# Patient Record
Sex: FEMALE | Race: WHITE | NOT HISPANIC OR LATINO | Employment: OTHER
[De-identification: names, ages, dates, MRNs, and addresses within clinical notes are randomized per-mention and may not be internally consistent; named-entity substitution may affect disease eponyms.]

---

## 2017-01-25 ENCOUNTER — RECORDS - HEALTHEAST (OUTPATIENT)
Dept: ADMINISTRATIVE | Facility: OTHER | Age: 57
End: 2017-01-25

## 2017-02-13 ENCOUNTER — HOSPITAL ENCOUNTER (OUTPATIENT)
Dept: RADIOLOGY | Facility: HOSPITAL | Age: 57
Discharge: HOME | End: 2017-02-13
Attending: UROLOGY

## 2017-02-13 DIAGNOSIS — N20.9 URINARY CALCULUS: ICD-10-CM

## 2017-02-22 ENCOUNTER — RECORDS - HEALTHEAST (OUTPATIENT)
Dept: ADMINISTRATIVE | Facility: OTHER | Age: 57
End: 2017-02-22

## 2017-02-22 ENCOUNTER — RECORDS - HEALTHEAST (OUTPATIENT)
Dept: LAB | Facility: CLINIC | Age: 57
End: 2017-02-22

## 2017-02-22 LAB
CHOLEST SERPL-MCNC: 221 MG/DL
FASTING STATUS PATIENT QL REPORTED: ABNORMAL
HDLC SERPL-MCNC: 63 MG/DL
LDLC SERPL CALC-MCNC: 111 MG/DL
TRIGL SERPL-MCNC: 234 MG/DL

## 2017-03-07 ENCOUNTER — AMBULATORY - HEALTHEAST (OUTPATIENT)
Dept: SURGERY | Facility: CLINIC | Age: 57
End: 2017-03-07

## 2017-03-07 DIAGNOSIS — E66.9 OBESITY: ICD-10-CM

## 2017-03-07 DIAGNOSIS — I10 ESSENTIAL (PRIMARY) HYPERTENSION: ICD-10-CM

## 2017-04-04 ENCOUNTER — COMMUNICATION - HEALTHEAST (OUTPATIENT)
Dept: SURGERY | Facility: CLINIC | Age: 57
End: 2017-04-04

## 2017-05-02 ENCOUNTER — COMMUNICATION - HEALTHEAST (OUTPATIENT)
Dept: SURGERY | Facility: CLINIC | Age: 57
End: 2017-05-02

## 2017-06-21 ENCOUNTER — OFFICE VISIT - HEALTHEAST (OUTPATIENT)
Dept: SURGERY | Facility: CLINIC | Age: 57
End: 2017-06-21

## 2017-06-21 DIAGNOSIS — Z87.19 HISTORY OF IBS: ICD-10-CM

## 2017-06-21 DIAGNOSIS — I10 HYPERTENSION: ICD-10-CM

## 2017-06-21 DIAGNOSIS — F25.9 SCHIZOAFFECTIVE DISORDER (H): ICD-10-CM

## 2017-06-21 DIAGNOSIS — E66.01 OBESITY, CLASS III, BMI 40-49.9 (MORBID OBESITY) (H): ICD-10-CM

## 2017-06-21 DIAGNOSIS — G47.33 OSA ON CPAP: ICD-10-CM

## 2017-06-21 RX ORDER — MONTELUKAST SODIUM 10 MG/1
10 TABLET ORAL DAILY
Status: SHIPPED | COMMUNITY
Start: 2017-06-17 | End: 2024-01-02

## 2017-06-21 RX ORDER — PRAVASTATIN SODIUM 40 MG
40 TABLET ORAL AT BEDTIME
Status: SHIPPED | COMMUNITY
Start: 2017-05-02

## 2017-06-21 RX ORDER — BENZTROPINE MESYLATE 1 MG/1
1 TABLET ORAL AT BEDTIME
Status: SHIPPED | COMMUNITY
Start: 2017-06-01 | End: 2024-01-02

## 2017-06-21 RX ORDER — PERPHENAZINE 8 MG/1
8 TABLET ORAL AT BEDTIME
Status: SHIPPED | COMMUNITY
Start: 2017-06-01

## 2017-06-21 ASSESSMENT — MIFFLIN-ST. JEOR: SCORE: 1758.6

## 2017-06-22 ENCOUNTER — COMMUNICATION - HEALTHEAST (OUTPATIENT)
Dept: SURGERY | Facility: CLINIC | Age: 57
End: 2017-06-22

## 2017-06-22 ENCOUNTER — COMMUNICATION - HEALTHEAST (OUTPATIENT)
Dept: SCHEDULING | Facility: CLINIC | Age: 57
End: 2017-06-22

## 2017-06-22 ENCOUNTER — OFFICE VISIT - HEALTHEAST (OUTPATIENT)
Dept: FAMILY MEDICINE | Facility: CLINIC | Age: 57
End: 2017-06-22

## 2017-06-22 DIAGNOSIS — T50.905A MEDICATION SIDE EFFECT, INITIAL ENCOUNTER: ICD-10-CM

## 2017-06-22 DIAGNOSIS — F25.9 SCHIZOAFFECTIVE DISORDER (H): ICD-10-CM

## 2017-06-22 DIAGNOSIS — F41.9 ANXIETY: ICD-10-CM

## 2017-07-03 ENCOUNTER — COMMUNICATION - HEALTHEAST (OUTPATIENT)
Dept: SCHEDULING | Facility: CLINIC | Age: 57
End: 2017-07-03

## 2017-07-15 ENCOUNTER — COMMUNICATION - HEALTHEAST (OUTPATIENT)
Dept: INTENSIVE CARE | Facility: HOSPITAL | Age: 57
End: 2017-07-15

## 2017-07-16 ENCOUNTER — COMMUNICATION - HEALTHEAST (OUTPATIENT)
Dept: FAMILY MEDICINE | Facility: CLINIC | Age: 57
End: 2017-07-16

## 2017-07-16 ENCOUNTER — OFFICE VISIT - HEALTHEAST (OUTPATIENT)
Dept: FAMILY MEDICINE | Facility: CLINIC | Age: 57
End: 2017-07-16

## 2017-07-16 ENCOUNTER — COMMUNICATION - HEALTHEAST (OUTPATIENT)
Dept: SCHEDULING | Facility: CLINIC | Age: 57
End: 2017-07-16

## 2017-07-16 DIAGNOSIS — M10.9 ACUTE GOUT: ICD-10-CM

## 2017-07-16 DIAGNOSIS — M79.672 LEFT FOOT PAIN: ICD-10-CM

## 2017-11-06 ENCOUNTER — HOSPITAL ENCOUNTER (OUTPATIENT)
Dept: MAMMOGRAPHY | Facility: HOSPITAL | Age: 57
Discharge: HOME | End: 2017-11-06
Attending: FAMILY MEDICINE

## 2017-11-06 DIAGNOSIS — Z12.31 VISIT FOR SCREENING MAMMOGRAM: ICD-10-CM

## 2017-12-01 ENCOUNTER — RECORDS - HEALTHEAST (OUTPATIENT)
Dept: ADMINISTRATIVE | Facility: OTHER | Age: 57
End: 2017-12-01

## 2018-02-01 ENCOUNTER — RECORDS - HEALTHEAST (OUTPATIENT)
Dept: ADMINISTRATIVE | Facility: OTHER | Age: 58
End: 2018-02-01

## 2018-03-21 ENCOUNTER — RECORDS - HEALTHEAST (OUTPATIENT)
Dept: ADMINISTRATIVE | Facility: OTHER | Age: 58
End: 2018-03-21

## 2018-05-29 ENCOUNTER — OFFICE VISIT - HEALTHEAST (OUTPATIENT)
Dept: RHEUMATOLOGY | Facility: CLINIC | Age: 58
End: 2018-05-29

## 2018-05-29 DIAGNOSIS — M25.50 POLYARTHRALGIA: ICD-10-CM

## 2018-05-29 DIAGNOSIS — M17.0 BILATERAL PRIMARY OSTEOARTHRITIS OF KNEE: ICD-10-CM

## 2018-05-30 ENCOUNTER — COMMUNICATION - HEALTHEAST (OUTPATIENT)
Dept: LAB | Facility: CLINIC | Age: 58
End: 2018-05-30

## 2018-05-31 ENCOUNTER — COMMUNICATION - HEALTHEAST (OUTPATIENT)
Dept: ADMINISTRATIVE | Facility: CLINIC | Age: 58
End: 2018-05-31

## 2018-06-19 ENCOUNTER — RECORDS - HEALTHEAST (OUTPATIENT)
Dept: LAB | Facility: CLINIC | Age: 58
End: 2018-06-19

## 2018-06-19 LAB
ALBUMIN SERPL-MCNC: 4.2 G/DL (ref 3.5–5)
ALP SERPL-CCNC: 113 U/L (ref 45–120)
ALT SERPL W P-5'-P-CCNC: 45 U/L (ref 0–45)
ANION GAP SERPL CALCULATED.3IONS-SCNC: 12 MMOL/L (ref 5–18)
AST SERPL W P-5'-P-CCNC: 41 U/L (ref 0–40)
BILIRUB SERPL-MCNC: 0.6 MG/DL (ref 0–1)
BUN SERPL-MCNC: 7 MG/DL (ref 8–22)
CALCIUM SERPL-MCNC: 10.1 MG/DL (ref 8.5–10.5)
CHLORIDE BLD-SCNC: 104 MMOL/L (ref 98–107)
CHOLEST SERPL-MCNC: 188 MG/DL
CO2 SERPL-SCNC: 25 MMOL/L (ref 22–31)
CREAT SERPL-MCNC: 0.75 MG/DL (ref 0.6–1.1)
FASTING STATUS PATIENT QL REPORTED: ABNORMAL
GFR SERPL CREATININE-BSD FRML MDRD: >60 ML/MIN/1.73M2
GLUCOSE BLD-MCNC: 171 MG/DL (ref 70–125)
HDLC SERPL-MCNC: 53 MG/DL
LDLC SERPL CALC-MCNC: 78 MG/DL
POTASSIUM BLD-SCNC: 3.9 MMOL/L (ref 3.5–5)
PROT SERPL-MCNC: 7.5 G/DL (ref 6–8)
SODIUM SERPL-SCNC: 141 MMOL/L (ref 136–145)
TRIGL SERPL-MCNC: 283 MG/DL

## 2018-11-03 ENCOUNTER — COMMUNICATION - HEALTHEAST (OUTPATIENT)
Dept: SCHEDULING | Facility: CLINIC | Age: 58
End: 2018-11-03

## 2018-11-04 ENCOUNTER — OFFICE VISIT - HEALTHEAST (OUTPATIENT)
Dept: FAMILY MEDICINE | Facility: CLINIC | Age: 58
End: 2018-11-04

## 2018-11-04 DIAGNOSIS — R29.898 PROMINENT XIPHOID: ICD-10-CM

## 2018-11-08 ENCOUNTER — HOSPITAL ENCOUNTER (OUTPATIENT)
Dept: MAMMOGRAPHY | Facility: CLINIC | Age: 58
Discharge: HOME | End: 2018-11-08
Attending: FAMILY MEDICINE

## 2018-11-08 DIAGNOSIS — Z12.31 ENCOUNTER FOR SCREENING MAMMOGRAM FOR BREAST CANCER: ICD-10-CM

## 2019-02-27 ENCOUNTER — RECORDS - HEALTHEAST (OUTPATIENT)
Dept: LAB | Facility: CLINIC | Age: 59
End: 2019-02-27

## 2019-03-01 LAB — BACTERIA SPEC CULT: ABNORMAL

## 2019-03-29 ENCOUNTER — RECORDS - HEALTHEAST (OUTPATIENT)
Dept: LAB | Facility: CLINIC | Age: 59
End: 2019-03-29

## 2019-03-29 LAB
ALBUMIN SERPL-MCNC: 4.3 G/DL (ref 3.5–5)
ALP SERPL-CCNC: 106 U/L (ref 45–120)
ALT SERPL W P-5'-P-CCNC: 27 U/L (ref 0–45)
ANION GAP SERPL CALCULATED.3IONS-SCNC: 11 MMOL/L (ref 5–18)
AST SERPL W P-5'-P-CCNC: 25 U/L (ref 0–40)
BILIRUB SERPL-MCNC: 0.4 MG/DL (ref 0–1)
BUN SERPL-MCNC: 9 MG/DL (ref 8–22)
CALCIUM SERPL-MCNC: 10.5 MG/DL (ref 8.5–10.5)
CHLORIDE BLD-SCNC: 103 MMOL/L (ref 98–107)
CHOLEST SERPL-MCNC: 189 MG/DL
CO2 SERPL-SCNC: 28 MMOL/L (ref 22–31)
CREAT SERPL-MCNC: 0.74 MG/DL (ref 0.6–1.1)
FASTING STATUS PATIENT QL REPORTED: ABNORMAL
GFR SERPL CREATININE-BSD FRML MDRD: >60 ML/MIN/1.73M2
GLUCOSE BLD-MCNC: 94 MG/DL (ref 70–125)
HDLC SERPL-MCNC: 70 MG/DL
LDLC SERPL CALC-MCNC: 88 MG/DL
POTASSIUM BLD-SCNC: 4.6 MMOL/L (ref 3.5–5)
PROT SERPL-MCNC: 7.6 G/DL (ref 6–8)
SODIUM SERPL-SCNC: 142 MMOL/L (ref 136–145)
TRIGL SERPL-MCNC: 155 MG/DL
TSH SERPL DL<=0.005 MIU/L-ACNC: 1.14 UIU/ML (ref 0.3–5)

## 2019-07-01 ENCOUNTER — RECORDS - HEALTHEAST (OUTPATIENT)
Dept: ADMINISTRATIVE | Facility: OTHER | Age: 59
End: 2019-07-01

## 2019-10-03 ENCOUNTER — RECORDS - HEALTHEAST (OUTPATIENT)
Dept: ADMINISTRATIVE | Facility: OTHER | Age: 59
End: 2019-10-03

## 2019-10-10 ENCOUNTER — RECORDS - HEALTHEAST (OUTPATIENT)
Dept: ADMINISTRATIVE | Facility: OTHER | Age: 59
End: 2019-10-10

## 2019-10-10 ENCOUNTER — AMBULATORY - HEALTHEAST (OUTPATIENT)
Dept: ADMINISTRATIVE | Facility: CLINIC | Age: 59
End: 2019-10-10

## 2019-10-10 DIAGNOSIS — R42 DIZZINESS: ICD-10-CM

## 2019-11-22 ENCOUNTER — OFFICE VISIT - HEALTHEAST (OUTPATIENT)
Dept: OTOLARYNGOLOGY | Facility: CLINIC | Age: 59
End: 2019-11-22

## 2019-11-22 ENCOUNTER — OFFICE VISIT - HEALTHEAST (OUTPATIENT)
Dept: AUDIOLOGY | Facility: CLINIC | Age: 59
End: 2019-11-22

## 2019-11-22 DIAGNOSIS — H90.3 SENSORINEURAL HEARING LOSS, BILATERAL: ICD-10-CM

## 2019-11-22 DIAGNOSIS — H93.13 TINNITUS, BILATERAL: ICD-10-CM

## 2019-11-22 DIAGNOSIS — R42 DIZZINESS AND GIDDINESS: ICD-10-CM

## 2019-11-22 DIAGNOSIS — Z87.898 HISTORY OF VERTIGO: ICD-10-CM

## 2019-11-22 DIAGNOSIS — M26.621 ARTHRALGIA OF RIGHT TEMPOROMANDIBULAR JOINT: ICD-10-CM

## 2019-11-29 ENCOUNTER — HOSPITAL ENCOUNTER (OUTPATIENT)
Dept: MAMMOGRAPHY | Facility: CLINIC | Age: 59
Discharge: HOME | End: 2019-11-29
Attending: FAMILY MEDICINE

## 2019-11-29 DIAGNOSIS — Z12.31 VISIT FOR SCREENING MAMMOGRAM: ICD-10-CM

## 2020-02-10 ENCOUNTER — RECORDS - HEALTHEAST (OUTPATIENT)
Dept: ADMINISTRATIVE | Facility: OTHER | Age: 60
End: 2020-02-10

## 2020-02-24 ENCOUNTER — COMMUNICATION - HEALTHEAST (OUTPATIENT)
Dept: SCHEDULING | Facility: CLINIC | Age: 60
End: 2020-02-24

## 2020-02-24 ENCOUNTER — TRANSFERRED RECORDS (OUTPATIENT)
Dept: HEALTH INFORMATION MANAGEMENT | Facility: CLINIC | Age: 60
End: 2020-02-24

## 2020-02-24 ENCOUNTER — HOSPITAL ENCOUNTER (OUTPATIENT)
Dept: MRI IMAGING | Facility: HOSPITAL | Age: 60
Discharge: HOME | End: 2020-02-24
Attending: PSYCHIATRY & NEUROLOGY

## 2020-02-24 DIAGNOSIS — R42 DIZZINESS: ICD-10-CM

## 2020-02-27 ENCOUNTER — RECORDS - HEALTHEAST (OUTPATIENT)
Dept: LAB | Facility: CLINIC | Age: 60
End: 2020-02-27

## 2020-02-27 LAB
FOLATE SERPL-MCNC: 7.8 NG/ML
TSH SERPL DL<=0.005 MIU/L-ACNC: 1.29 UIU/ML (ref 0.3–5)
VIT B12 SERPL-MCNC: 215 PG/ML (ref 213–816)

## 2020-02-28 LAB — 25(OH)D3 SERPL-MCNC: 7.3 NG/ML (ref 30–80)

## 2020-03-03 ENCOUNTER — RECORDS - HEALTHEAST (OUTPATIENT)
Dept: LAB | Facility: HOSPITAL | Age: 60
End: 2020-03-03

## 2020-03-03 LAB
25(OH)D3 SERPL-MCNC: 7.5 NG/ML (ref 30–80)
FOLATE SERPL-MCNC: 9.9 NG/ML
TSH SERPL DL<=0.005 MIU/L-ACNC: 3.24 UIU/ML (ref 0.3–5)
VIT B12 SERPL-MCNC: 194 PG/ML (ref 213–816)

## 2020-07-21 ENCOUNTER — COMMUNICATION - HEALTHEAST (OUTPATIENT)
Dept: SCHEDULING | Facility: CLINIC | Age: 60
End: 2020-07-21

## 2020-09-11 ENCOUNTER — HOSPITAL ENCOUNTER (OUTPATIENT)
Dept: LAB | Age: 60
Setting detail: SPECIMEN
Discharge: HOME | End: 2020-09-11

## 2020-09-11 ENCOUNTER — RECORDS - HEALTHEAST (OUTPATIENT)
Dept: LAB | Facility: CLINIC | Age: 60
End: 2020-09-11

## 2020-09-11 LAB
ALBUMIN SERPL-MCNC: 4.2 G/DL (ref 3.5–5)
ALP SERPL-CCNC: 116 U/L (ref 45–120)
ALT SERPL W P-5'-P-CCNC: 25 U/L (ref 0–45)
ANION GAP SERPL CALCULATED.3IONS-SCNC: 14 MMOL/L (ref 5–18)
AST SERPL W P-5'-P-CCNC: 18 U/L (ref 0–40)
BILIRUB SERPL-MCNC: 0.5 MG/DL (ref 0–1)
BUN SERPL-MCNC: 10 MG/DL (ref 8–22)
CALCIUM SERPL-MCNC: 9.8 MG/DL (ref 8.5–10.5)
CHLORIDE BLD-SCNC: 107 MMOL/L (ref 98–107)
CHOLEST SERPL-MCNC: 178 MG/DL
CO2 SERPL-SCNC: 23 MMOL/L (ref 22–31)
CREAT SERPL-MCNC: 0.69 MG/DL (ref 0.6–1.1)
FASTING STATUS PATIENT QL REPORTED: NORMAL
GFR SERPL CREATININE-BSD FRML MDRD: >60 ML/MIN/1.73M2
GLUCOSE BLD-MCNC: 103 MG/DL (ref 70–125)
HDLC SERPL-MCNC: 68 MG/DL
LDLC SERPL CALC-MCNC: 88 MG/DL
POTASSIUM BLD-SCNC: 4.2 MMOL/L (ref 3.5–5)
PROT SERPL-MCNC: 7.2 G/DL (ref 6–8)
SODIUM SERPL-SCNC: 144 MMOL/L (ref 136–145)
TRIGL SERPL-MCNC: 111 MG/DL
TSH SERPL DL<=0.005 MIU/L-ACNC: 1.32 UIU/ML (ref 0.3–5)
VIT B12 SERPL-MCNC: 799 PG/ML (ref 213–816)

## 2020-09-14 LAB
25(OH)D3 SERPL-MCNC: 60 NG/ML (ref 30–80)
COVID-19 ANTIBODY IGG: NEGATIVE

## 2020-12-15 ENCOUNTER — COMMUNICATION - HEALTHEAST (OUTPATIENT)
Dept: OTOLARYNGOLOGY | Facility: CLINIC | Age: 60
End: 2020-12-15

## 2021-03-06 ENCOUNTER — COMMUNICATION - HEALTHEAST (OUTPATIENT)
Dept: OTOLARYNGOLOGY | Facility: CLINIC | Age: 61
End: 2021-03-06

## 2021-05-18 ENCOUNTER — COMMUNICATION - HEALTHEAST (OUTPATIENT)
Dept: SCHEDULING | Facility: CLINIC | Age: 61
End: 2021-05-18

## 2021-05-26 ENCOUNTER — RECORDS - HEALTHEAST (OUTPATIENT)
Dept: LAB | Facility: CLINIC | Age: 61
End: 2021-05-26

## 2021-05-26 LAB
ALBUMIN SERPL-MCNC: 4.2 G/DL (ref 3.5–5)
ALP SERPL-CCNC: 109 U/L (ref 45–120)
ALT SERPL W P-5'-P-CCNC: 25 U/L (ref 0–45)
ANION GAP SERPL CALCULATED.3IONS-SCNC: 14 MMOL/L (ref 5–18)
AST SERPL W P-5'-P-CCNC: 20 U/L (ref 0–40)
BILIRUB SERPL-MCNC: 0.4 MG/DL (ref 0–1)
BUN SERPL-MCNC: 8 MG/DL (ref 8–22)
CALCIUM SERPL-MCNC: 9.3 MG/DL (ref 8.5–10.5)
CHLORIDE BLD-SCNC: 103 MMOL/L (ref 98–107)
CHOLEST SERPL-MCNC: 172 MG/DL
CO2 SERPL-SCNC: 22 MMOL/L (ref 22–31)
CREAT SERPL-MCNC: 0.72 MG/DL (ref 0.6–1.1)
FASTING STATUS PATIENT QL REPORTED: ABNORMAL
GFR SERPL CREATININE-BSD FRML MDRD: >60 ML/MIN/1.73M2
GLUCOSE BLD-MCNC: 118 MG/DL (ref 70–125)
HDLC SERPL-MCNC: 65 MG/DL
LDLC SERPL CALC-MCNC: 73 MG/DL
POTASSIUM BLD-SCNC: 4.1 MMOL/L (ref 3.5–5)
PROT SERPL-MCNC: 7.2 G/DL (ref 6–8)
SODIUM SERPL-SCNC: 139 MMOL/L (ref 136–145)
TRIGL SERPL-MCNC: 171 MG/DL
VIT B12 SERPL-MCNC: 835 PG/ML (ref 213–816)

## 2021-05-27 LAB — 25(OH)D3 SERPL-MCNC: 56.6 NG/ML (ref 30–80)

## 2021-05-31 ENCOUNTER — RECORDS - HEALTHEAST (OUTPATIENT)
Dept: ADMINISTRATIVE | Facility: CLINIC | Age: 61
End: 2021-05-31

## 2021-05-31 VITALS — WEIGHT: 253.7 LBS | BODY MASS INDEX: 40.33 KG/M2

## 2021-05-31 VITALS — HEIGHT: 67 IN | BODY MASS INDEX: 40.62 KG/M2 | WEIGHT: 258.8 LBS

## 2021-06-01 ENCOUNTER — RECORDS - HEALTHEAST (OUTPATIENT)
Dept: ADMINISTRATIVE | Facility: CLINIC | Age: 61
End: 2021-06-01

## 2021-06-01 VITALS — WEIGHT: 250 LBS | BODY MASS INDEX: 39.75 KG/M2

## 2021-06-02 VITALS — BODY MASS INDEX: 32.85 KG/M2 | WEIGHT: 206.6 LBS

## 2021-06-03 NOTE — PROGRESS NOTES
HISTORY OF PRESENT ILLNESS  Patient reports that she was hospitalized in March because she stood up and thought that she was fainting. She got very dizzy. She was told to see and ENT. She has been sick a fair amount and was unable to come in until recently. Pain in the right ear that comes and goes. She can't turn her body very fast or else she will get dizzy. Looking down is also a problems. No change in her hearing. She has constant ringing in the ear. She reports that she had an episode of dizziness several months ago. She had two episodes of dizziness since March. The first time the symptoms lasted an hour. Most recently it lasted 5-10 minutes. She is a diabetic and her medications haven't changed.     REVIEW OF SYSTEMS  Review of Systems: a 10-system review was performed. Pertinent positives are noted in the HPI and on a separate scanned document in the chart.    PMH, PSH, FH and SH has documented in the EHR.      EXAM    CONSTITUTIONAL  General Appearance:  Normal, well developed, well nourished, no obvious distress  Ability to Communicate:  communicates appropriately.    HEAD AND FACE  Appearance and Symmetry:  Normal, no scalp or facial scarring or suspicious lesions.  Paranasal sinuses tenderness:  Normal, Paranasal sinuses non tender  TMJ: Pain to palpation right TMJ    EARS  Clinical speech reception threshold:  Normal, able to hear normal speech.  Auricle:  Normal, Auricles without scars, lesions, masses.  External auditory canal:  Normal, External auditory canal normal.  Tympanic membrane:  Normal, Tympanic membranes normal without swelling or erythema.  Tympanic membrane mobility:  Normal, Normal tympanic membrane mobility.    NOSE (speculum or scope)  Architecture:  Normal, Grossly normal external nasal architecture with no masses or lesions.  Mucosa:  Normal mucosa, No polyps or masses.  Septum:  Normal, Septum non-obstructing.  Turbinates:  Normal, No turbinate abnormalities    ORAL CAVITY AND  OROPHARYNX  Lips:  Normal.  Dental and gingiva:  Normal, No obvious dental or gingival disease.  Mucosa:  Normal, Moist mucous membranes.  Tongue:  Normal, Tongue mobile with no mucosal abnormalities  Hard and soft palate:  Normal, Hard and soft palate without cleft or mucosal lesions.  Oral pharynx:  Normal, Posterior pharynx without lesions or remarkable asymmetry.  Saliva:  Normal, Clear saliva.  Masses:  Normal, No palpable masses or pathologically enlarged lymph nodes.    NECK  Masses/lymph nodes:  Normal, No worrisome neck masses or lymph nodes.  Salivary glands:  Normal, Parotid and submandibular glands.  Trachea and larynx position:  Normal, Trachea and larynx midline.  Thyroid:  Normal, No thyroid abnormality.  Tenderness:  Normal, No cervical tenderness.  Suppleness:  Normal, Neck supple    NEUROLOGICAL  Speech pattern:  Normal, Proasaic    RESPIRATORY  Symmetry and Respiratory effort:  Normal, Symmetric chest movement and expansion with no increased intercostal retractions or use of accessory muscles.     IMPRESSION  Patient has several concerns regarding her symptoms. Her first was that her symptoms might be a sign of Meniere's. I reassured her that this was unlikely given the symmetrical hearing and lack of tinnitus and hearing loss with the episodes. In addition she has only had two episodes, one lasting an hour and the other lasting 5 minutes. She reports sensation of dysequilibrium with looking down. However no symptoms of vertigo. I explained that the side effect profile of several of her medications include slight dysequilibrium / dizziness. With regards to her pain in the ear, this is likely related TMJ. The pain wakes her up at night and is in the right jaw. She had pain to palpation of the right TMJ. Lastly her tinnitus is likely related to the hearing loss.     RECOMMENDATION  I discussed vestibular evaluation. She is going to hold off for now and see how her symptoms progress. She understands  that if her symptoms of dizziness return or worsen she can call to set up VNG and vestibular evaluation.     Bernardo Springer MD

## 2021-06-06 NOTE — TELEPHONE ENCOUNTER
Pt was wondering if she should go to ED or not.    Pt is awaiting call back from her neurologist.  Worsening dizziness, has seen neurology, unsure if she should go to ED.  Pt follows with [facility].   Laying down helps, has nausea if she tries to get up.   BS is 161. Pt is diabetic.   Feels really hot. Unsure if she has a fever, fan helps.   Denies difficulty breathing.   Difficulty standing for long periods of time.   Unable to bend down or look down.       Reason for Disposition    SEVERE dizziness (e.g., unable to stand, requires support to walk, feels like passing out now)    Protocols used: DIZZINESS - [id number]    Triaged to disposition of Go to ED now or PCP triage.  Pt stated understanding to recommendations. RN advised patient to either contact PCP/Neurologist or go to ED based on recommendations from protocol.  RN recommended if she goes to ED to have someone else drive her.   Pt stated understanding to recommendations.   Brandi Buitrago, RN   [facility] RN Triage

## 2021-06-09 NOTE — TELEPHONE ENCOUNTER
Pt called stated she has diabetes and worried about if she contracted covid what will happen to her, and how she can prevent from getting covid 19. Pt has no covid symptomes or exposure. Pt stated she also wants reassurance because she has anxiety about covid 19.Pt was given covid19 prevention recommendations, and to stay positive, eating healthy, getting enough sleep, physically active, and to stay well hydrated. Pt was also advised to washing hands often, wear face mask, staying away from other people 6 feet. Pt stated okay.  Sam Blackmon RN  COVID 19 Nurse Triage Plan/Patient Instructions    Please be aware that novel coronavirus (COVID-19) may be circulating in the community. If you develop symptoms such as fever, cough, or SOB or if you have concerns about the presence of another infection including coronavirus (COVID-19), please contact your health care provider or visit [email or web address].     Disposition/Instructions    Home care recommended. Follow home care protocol based instructions.    Thank you for taking steps to prevent the spread of this virus.  o Limit your contact with others.  o Wear a simple mask to cover your cough.  o Wash your hands well and often.    Resources    [facility]: About COVID-19: [email or web address]    CDC: What to Do If You're Sick: www.cdc.gov/coronavirus/2019-ncov/about/steps-when-sick.html    CDC: Ending Home Isolation: www.cdc.gov/coronavirus/2019-ncov/hcp/disposition-in-home-patients.html     CDC: Caring for Someone: www.cdc.gov/coronavirus/2019-ncov/if-you-are-sick/care-for-someone.html     [facility]: Interim Guidance for Hospital Discharge to Home: www.health.[facility].[place]/diseases/coronavirus/hcp/hospdischarge.pdf    [facility] clinical trials (COVID-19 research studies): clinicalaffairs.[email or web address]/umn-clinical-trials     Below are the COVID-19 hotlines at the [place] Department [facility] ([facility]). Interpreters are available.   o For health questions:  Call 103-599-3753 or 1-851.907.2839 (7 a.m. to 7 p.m.)  o For questions about schools and childcare: Call 254-318-6795 or 1-519.357.4295 (7 a.m. to 7 p.m.)     Reason for Disposition    COVID-19 Prevention and Healthy Living, questions about    Additional Information    Negative: COVID-19 has been diagnosed by a healthcare provider (HCP)    Negative: COVID-19 lab test positive    Negative: [1] Symptoms of COVID-19 (e.g., cough, fever, SOB, or others) AND [2] lives in an area with community spread    Negative: [1] Symptoms of COVID-19 (e.g., cough, fever, SOB, or others) AND [2] within 14 days of EXPOSURE (close contact) with diagnosed or suspected COVID-19 patient    Negative: [1] Symptoms of COVID-19 (e.g., cough, fever, SOB, or others) AND [2] within 14 days of travel from high-risk area for COVID-19 community spread (identified by CDC)    Negative: [1] Difficulty breathing (shortness of breath) occurs AND [2] onset > 14 days after COVID-19 EXPOSURE (Close Contact) AND [3] no community spread where patient lives    Negative: [1] Dry cough occurs AND [2] onset > 14 days after COVID-19 EXPOSURE AND [3] no community spread where patient lives    Negative: [1] Wet cough (i.e., white-yellow, yellow, green, or palmer colored sputum) AND [2] onset > 14 days after COVID-19 EXPOSURE AND [3] no community spread where patient lives    Negative: [1] Common cold symptoms AND [2] onset > 14 days after COVID-19 EXPOSURE AND [3] no community spread where patient lives    Negative: [1] COVID-19 EXPOSURE (Close Contact) within last 14 days AND [2] needs COVID-19 lab test to return to work AND [3] NO symptoms    Negative: [1] COVID-19 EXPOSURE (Close Contact) within last 14 days AND [2] exposed person is a healthcare worker who was NOT using all recommended personal protective equipment (i.e., a respirator-N95 mask, eye protection, gloves, and gown) AND [3] NO symptoms    Negative: [1] COVID-19 EXPOSURE (Close Contact) AND [2] within  last 14 days BUT [3] NO symptoms    Negative: [1] COVID-19 EXPOSURE AND [2] 15 or more days ago AND [3] NO symptoms    Negative: [1] Living in area with community spread (identified by local PHD) BUT [2] NO symptoms    Negative: [1] Travel from area with community spread (identified by CDC) AND [2] within last 14 days BUT [3] NO symptoms    Negative: [1] No COVID-19 EXPOSURE BUT [2] living with someone who was exposed and who has no symptoms of COVID-19    Negative: [1] Caller concerned that exposure to COVID-19 occurred BUT [2] does not meet COVID-19 EXPOSURE criteria from CDC    Negative: COVID-19 Testing, questions about    Protocols used: CORONAVIRUS (COVID-19) EXPOSURE-A- 5.16.20

## 2021-06-11 NOTE — PROGRESS NOTES
Chief Complaint   Patient presents with     Toe Pain     right great toe, redness, swelling, very painful x 4 days       HPI    Patient is here for 4 days of moderate, constant left great toe pain with redness and swelling. No injury, fever, chills. He has remote hx of gout. No diabetes.     ROS: Pertinent ROS noted in HPI.     Allergies   Allergen Reactions     Naltrexone Anxiety     Protonix [Pantoprazole] Nausea And Vomiting     Abilify [Aripiprazole] Other (See Comments)     seizure     Albuterol Other (See Comments)     shakey     Geodon [Ziprasidone Hcl] Other (See Comments)     psychosis     Saphris [Asenapine] Unknown     Demerol [Meperidine] Other (See Comments)     lightheaded     Naproxen Palpitations     Omeprazole Palpitations     Palpitations with dosage above 10 mg     Septra [Sulfamethoxazole-Trimethoprim] Unknown     Possibly caused a UTI?       There is no problem list on file for this patient.      Family History   Problem Relation Age of Onset     Multiple sclerosis Mother      Cancer Father      Hypertension Father      Diabetes Father      No Medical Problems Sister      No Medical Problems Daughter      No Medical Problems Maternal Grandmother      No Medical Problems Maternal Grandfather      No Medical Problems Paternal Grandmother      Cancer Paternal Grandfather      No Medical Problems Maternal Aunt      No Medical Problems Paternal Aunt      Obesity Brother      Diabetes Brother      Hyperlipidemia Brother      Hypertension Brother      BRCA 1/2 Neg Hx      Breast cancer Neg Hx      Colon cancer Neg Hx      Endometrial cancer Neg Hx      Ovarian cancer Neg Hx        Social History     Social History     Marital status:      Spouse name: N/A     Number of children: N/A     Years of education: N/A     Occupational History     Not on file.     Social History Main Topics     Smoking status: Former Smoker     Quit date: 1/1/1997     Smokeless tobacco: Never Used      Comment: Quit  15-20 years ago     Alcohol use No     Drug use: No     Sexual activity: Not Currently     Birth control/ protection: Surgical      Comment: partial hysterectomy     Other Topics Concern     Not on file     Social History Narrative         Objective:    Vitals:    07/16/17 1301   BP: 130/80   Pulse: 66   Temp: 98  F (36.7  C)   SpO2: 97%       Gen:NAD  MSK: moderate erythema and edema without fluctuance of left first MTP, with moderate pain to palpation. Warmth noted. Reduced ROM of the affected joint.   Neuro: intact gross sensation of the affected toe.     Recent Results (from the past 24 hour(s))   HM1 (CBC with Diff)   Result Value Ref Range    WBC 8.7 4.0 - 11.0 thou/uL    RBC 4.92 3.80 - 5.40 mill/uL    Hemoglobin 14.6 12.0 - 16.0 g/dL    Hematocrit 43.2 35.0 - 47.0 %    MCV 88 80 - 100 fL    MCH 29.7 27.0 - 34.0 pg    MCHC 33.7 32.0 - 36.0 g/dL    RDW 11.5 11.0 - 14.5 %    Platelets 215 140 - 440 thou/uL    MPV 7.4 7.0 - 10.0 fL    Neutrophils % 65 50 - 70 %    Lymphocytes % 23 20 - 40 %    Monocytes % 9 2 - 10 %    Eosinophils % 2 0 - 6 %    Basophils % 1 0 - 2 %    Neutrophils Absolute 5.6 2.0 - 7.7 thou/uL    Lymphocytes Absolute 2.0 0.8 - 4.4 thou/uL    Monocytes Absolute 0.8 0.0 - 0.9 thou/uL    Eosinophils Absolute 0.2 0.0 - 0.4 thou/uL    Basophils Absolute 0.1 0.0 - 0.2 thou/uL         Acute gout  -     colchicine (MITIGARE) 0.6 mg cap; Take 0.6 mg by mouth daily.    Left foot pain  -     HM1(CBC and Differential)  -     Uric Acid  -     HM1 (CBC with Diff)      Will call for uric acid results. Supportive cares and f/u as directed.       Addendum:    Patient's insurance won't cover Colchicine. She cannot tolerate Naproxen and so doesn't want to try Indomethacin. Will prescribe Prednisone.

## 2021-06-11 NOTE — PROGRESS NOTES
[facility]:  Non-Surgical Weight Loss Intake Appointment   Date of visit: 6/21/2017  Physician: Alfredito Casillas MD  Primary Care is Carole Chandler MD.  Mayra Wheeler   57 y.o.  female  Mayra is a 57 y.o. year old female who is here today for consultation regarding non-surgical weight loss.   she was referred to the [facility] by her PCP, Dr. Chandler.    Weight History:   Wt Readings from Last 3 Encounters:   06/21/17 (!) 258 lb 12.8 oz (117.4 kg)   02/04/16 (!) 244 lb (110.7 kg)   01/26/16 (!) 244 lb (110.7 kg)     Body mass index is 41.15 kg/(m^2).       Assessment and Plan   Assessment: Mayra Wheeler is a 57 y.o.,female presenting for assistance with medical weight loss.  Identified issues contributing to her excess weight include:     She's battled schizoaffective disorder for the last 35 years or so and has been on many psychotropic medications through the years that can make it easy to gain weight. She follows a mostly vegetarian diet that is protein poor and with a heavy reliance on diet soda, has some compensatory sugar cravings that leads to excess junk food/chocolate consumption.     She is regularly exercising and enjoys bicycle riding in the [place] area 3-4 days weekly as long as it's not too hot (due to overheating risks on her meds).     She has some IBS that would likely improve with less reliance on legumes and diet soda. Discussed her attempt at starting a low FODMAP diet but she never did start the recommendations her PCP offered.     She does qualify for weight loss surgery but is not open to this at this point in time. If she changes her mind, her mental health does seem stable enough to be considered and enter the program if she would wish to pursue that tool.  She'd have to have clearance from our bariatric psychologist and her psychiatrist should she pursue that option in the future. Her non surgical weight loss would be expected to be  slower given her medications/limitations.        Plan:  1.  Behavior Goals: 3 daily meals plan, ideally with a large breakfast, medium lunch        and smaller dinner. Avoidance of sugared-sweetened beverages and processed        carbohydrate snacks.  Work towards pre-planning meals to avoid falling back into old             habits/obesogenic habits.  Daily Food Tracking and morning weight recommended.  Weekly       check-in through [employer] to increase compliance.    2.  Diet Goals: Recommend a 1300-1500Calorie daily restriction.  Increased protein intake to insure at        least 20-30 grams of protein per meal.      3.  Exercise/Activity Goals: continue riding bicycle 3-4 days weekly.  Long term goal of increasing endurance to allow for at least            200 minutes weekly of moderate exercise to maintain weight loss.      4.  Recommendation regarding weight loss medication:  Not a candidate for stimulant appetite suppressants due to risk of psychosis. Her BP did come down to normal levels on recheck at the end of the interview.  Naltrexone trial will be started given that some research shows efficacy for counteracting weight gain associated with psychotropic medications (pubmed search completed today to confirm study). Will d/c if intolerant or mood swings.  Will see if any signs of insulin resistance as metformin could be of use as well.    Her Zoloft, and Neurontin do impair satiety. I don't have much information re: perphenazine on weight gain but similar medications can have weight gain tendencies.    5.  Referral to Dietician for further education and customization of diet plan.    6.  Stress Reduction: doing well, love riding her bicycle.    7.  Intake Labs:  Ordered. Recent lipids show hyperlipidemia/hypertriglyceridemia c/w with her sugar reliance, she's on a statin.  Discussed trial of OTC probiotic and fish oil as well.Some mild lft elevation in the past c/w her hx of fatty liver disease. Weight loss  would benefit.    8.   Schizoaffective disorder. Has been stable on meds which make weight loss more difficult but given no hospitalizations since the 80s, her regimen seems to be working well.     9. Hypertension. Initially elevated but on my recheck with a proper fitting cuff and arm supported at heart level, her BP was 120/s70s. Will continue with her Cozaar and HCTZ and follow with her PCP. Weight loss should improve this.     10. Hypertriglyceridemia: less sugar, could consider fish oil trial . Higher protein/healthy fat/omega threes should benefit her.    11. IBS hx: encourged to look at food journal and correlate with GI sx, consider the low FODMAP diet and for now cut way back on artificial sweetener.  Will work with dietician on complete protein consumption given her vegetarian leanings and not being too knowledgeable regarding what she needs to feel her best.  Probiotic may help.     12. VONDA on CPAP, machine broke and new one arriving. She's not sure if she sleeps better with our without the machine and I encouraged her to discuss her sleep health with her sleep specialist.  Proper sleep will improve successful weight loss.    .  1. Obesity, Class III, BMI 40-49.9 (morbid obesity)  Amb Referral to Bariatric Dietician    naltrexone (DEPADE) 50 mg tablet    Comprehensive Metabolic Panel    Glycosylated Hemoglobin A1c    HM2(CBC w/o Differential)    Parathyroid Hormone Intact    Thyroid Stimulating Hormone (TSH)    Vitamin B12    Vitamin D, Total (25-Hydroxy)    CANCELED: Comprehensive Metabolic Panel    CANCELED: HM2(CBC w/o Differential)    CANCELED: Vitamin D, Total (25-Hydroxy)    CANCELED: Parathyroid Hormone Intact    CANCELED: Thyroid Stimulating Hormone (TSH)    CANCELED: Vitamin B12    CANCELED: Glycosylated Hemoglobin A1c   2. Schizoaffective disorder     3. Hypertension     4. History of IBS     5. VONDA on CPAP            Return in about 4 weeks (around 7/19/2017) for Recheck.     Weight and  Lifestyle History    Sleep history: Goes to bed around 10pm (takes meds), falls asleep 1-2am. Is on her device/social media until she falls asleep. Stays asleep. Gets up at 8-9am, stays up for half an hour, falls asleep until 11, then up and eats (couple eggs with tortilla salsa and cheese) and falls asleep until 1-2pm. Takes her zoloft and singulair in a.m., otherwise all at bedtime. Once up at 2pm, she showers, eats (bowl of nachos/cheese and salsa). Likes to snack on chocolate day and night. Does housework and if no appointments will get on her bike and ride for an hour, showers up and then makes dinner (lots of pasta, tries to avoid beans)  Hours per night: see above  Snoring/apnea/insomnia? no  Restful/refreshed? yes  Shift work? no  CPAP/BiPAP? some  Sleep study previously? yes  TV in bedroom? no  Sleep aids/pills? Psych meds      STOP-BANG score for sleep apnea : na on cpap.  For general population   VONDA - Low Risk : Yes to 0 - 2 questions  VONDA - Intermediate Risk : Yes to 3 - 4 questions  VONDA - High Risk : Yes to 5 - 8 questions  or Yes to 2 or more of 4 STOP questions + male gender  or Yes to 2 or more of 4 STOP questions + BMI > 35kg/m2  or Yes to 2 or more of 4 STOP questions + neck circumference 17 inches / 43cm in male or 16 inches / 41cm in female    Weight History:  In what way is your excess weight affecting your wellness/health? Trouble getting around, back pain  Heaviest weight: na  Any Previous weight loss, what was the most lost and method: na  Birth weIight, High School graduation weight: na  Lowest adult weight: na  Maternal health/smoking/weight: na  When did you start gaining weight and what were the circumstances? na  Is anyone else in your immediate family overweight? na  Finally,  Is there a weight you desire to be, what is your goal weight that would define successful weight loss for you? Under 200 lbs   I would like to lose weight so I can  :  Feel better   .     Barriers to weight  "loss:  Is anyone else at home/work/family a barrier to your weight loss? no  Work schedule/location? home  Current stressors include: no  Mobility problems: back pains    Counseled on health benefits of weight loss, goals for metabolic/diabetic risk reduction, HTN reduction, improved sleep and mobility, cancer reduction, longevity.    Fitness History:  Were you ever an athlete?na     Which sports? na  When was the last time you walked/hiked a mile?rides bike regularly for an hour.  Do you have any limitations for activity?back pains, extreme heat puts her at risk for heat stroke due to meds.  Do you have access to a gym, pool, club, fitness center, or ?bike                Do you have any fitness goals/dreams that could motivate your weight loss?loves to bike     On a scale from 0-10,  how willing are you to start some sort of movement/exercise regimen? Already riding 3 days weekly    Counseled on physiologic benefits of exercise and for long term weight maintenance, goal working towards 200-300 minutes weekly.     Food History:  Who buys groceries?    Do you eat at dinner table, is the TV on or off, family present? na  Any soda, how much? \"lots\" of diet soda  Meals per day? 2-3  Snacks per day? 2-3   Breakfast typically is: see above  Lunch typically is: see above  Dinner  is: see above  Weekly Fast Food/dining out: na  Snacks consist of: chocolate/chips.    Food sensitivities/allergies/intolerances/avoidances: no  Water per day? na    Any binging behavior?no  Any induced vomiting/purging? no  Any history of anorexia/bulimia? no  Any night eating issues? no  Stress induced eating? no    Goal Setting:  Short Term Goals 25 lbs is 10% weight loss, 233 lbs  Long Term Goals under 200 lbs.         Patient Profile   Social History     Social History Narrative     Family History   Problem Relation Age of Onset     Multiple sclerosis Mother      Cancer Father      Hypertension Father      Diabetes Father      No " Medical Problems Sister      No Medical Problems Daughter      No Medical Problems Maternal Grandmother      No Medical Problems Maternal Grandfather      No Medical Problems Paternal Grandmother      Cancer Paternal Grandfather      No Medical Problems Maternal Aunt      No Medical Problems Paternal Aunt      Obesity Brother      Diabetes Brother      Hyperlipidemia Brother      Hypertension Brother      BRCA 1/2 Neg Hx      Breast cancer Neg Hx      Colon cancer Neg Hx      Endometrial cancer Neg Hx      Ovarian cancer Neg Hx           Past Medical History   There is no problem list on file for this patient.    Protonix [pantoprazole]; Abilify [aripiprazole]; Albuterol; Geodon [ziprasidone hcl]; Saphris [asenapine]; Demerol [meperidine]; Naproxen; Omeprazole; and Septra [sulfamethoxazole-trimethoprim]  Current Outpatient Prescriptions   Medication Sig Note     albuterol (PROVENTIL HFA;VENTOLIN HFA) 90 mcg/actuation inhaler Inhale 2 puffs 4 (four) times a day as needed for wheezing or shortness of breath.      benztropine (COGENTIN) 1 MG tablet  6/21/2017: Take 1 tablet in PM     dicyclomine (BENTYL) 20 mg tablet Take 20 mg by mouth 4 (four) times a day as needed.      gabapentin (NEURONTIN) 300 MG capsule Take 600 mg by mouth bedtime.       hydrochlorothiazide (HYDRODIURIL) 25 MG tablet Take 25 mg by mouth bedtime.       mometasone (NASONEX) 50 mcg/actuation nasal spray 2 sprays into each nostril daily as needed.       montelukast (SINGULAIR) 10 mg tablet Take 10 mg by mouth daily. 6/21/2017: Received from: External Pharmacy     perphenazine 8 MG tablet  6/21/2017: Started in 1988     pravastatin (PRAVACHOL) 40 MG tablet  6/21/2017: Take 1 tablet at bedtime     ranitidine (ZANTAC) 150 MG tablet Take 150 mg by mouth bedtime as needed for heartburn.       sertraline (ZOLOFT) 100 MG tablet Take 200 mg by mouth daily.       traZODone (DESYREL) 100 MG tablet Take 100 mg by mouth bedtime.      naltrexone (DEPADE) 50 mg  "tablet Days 0-10: Half a tablet in the am. Days 11 and up: Half tablet in am and half tablet in pm. Do not mix with narcotics.        Past Surgical History  She has a past surgical history that includes Hysterectomy;  section; Tubal ligation; Cystoscopy w/ ureteral stent placement (Right, 2016); and  section, classic.     Examination   BP (!) 166/92 (Patient Site: Left Arm, Patient Position: Sitting, Cuff Size: Adult Large)  Pulse 75  Ht 5' 6.5\" (1.689 m)  Wt (!) 258 lb 12.8 oz (117.4 kg)  SpO2 94%  Breastfeeding? No  BMI 41.15 kg/m2  Height: 5' 6.5\" (1.689 m) (2017  2:27 PM)  Initial Weight: 258.8 lbs (2017  2:27 PM)  Weight: 258 lb 12.8 oz (117.4 kg) (2017  2:27 PM)  Weight loss from initial: 0 (2017  2:27 PM)  % Weight loss: 0 % (2017  2:27 PM)  BMI (Calculated): 41.2 (2017  2:27 PM)  SpO2: 94 % (2017  2:27 PM)  Heart Rate (/min): 75 (2017  2:27 PM)  BP (mmHg): 166/92 (2017  2:27 PM)  Waist Circumference (In): 52.5 Inches (2017  2:27 PM)  Hip Circumference (In): 46.25 Inches (2017  2:27 PM)  Neck Circumference (In): 17.5 Inches (2017  2:27 PM)   /73    General:  Alert and ambulatory, no distress.  HEENT:  No conjunctival pallor, moist mucous Membranes, neck is supple  Pulmonary:  Normal respiratory effort, no cough, no audible wheezes/crackles.  CV:  Regular rate and Rhythm, no murmurs, pulses 2 plus  Abdominal: obese.  Extremities: no edema  Skin:  Warm/dry  Pscyh/Mood: pleasant, intelligent.                                    LABS: ordered    Last recorded labs include:  Lab Results   Component Value Date    WBC 12.5 (H) 2016    HGB 15.0 2016    HCT 45.7 2016    MCV 88 2016     2016      No results found for: [id number] No results found for: HGBA1C   Lab Results   Component Value Date    CHOL 221 (H) 2017    No results found for: PTH      No results found for: FERRITIN   Lab " Results   Component Value Date    HDL 63 2017      No results found for: RYBWLOJB50 No results found for: 76209   Lab Results   Component Value Date    LDLCALC 111 2017    Lab Results   Component Value Date    TSH 1.80 2015    No results found for: FOLATE   Lab Results   Component Value Date    TRIG 234 (H) 2017    Lab Results   Component Value Date    ALT 57 (H) 2017    AST 39 2017    ALKPHOS 125 (H) 2017    BILITOT 0.5 2017    No results found for: TESTOSTERONE     No components found for: CHOLHDL No results found for: 7597   @resusfast(vitamin a: 1)@       Other Notables/imaging:     Counselin minutes spent in direct consultation with the patient regarding conditions contributing to excess weight accumulation, with over 50% of the time spent in counseling, goal setting and initiating a plan to lose their excess weight.    Alfredito Casillas MD  [facility].  2017

## 2021-06-11 NOTE — PROGRESS NOTES
Subjective:   Mayra Wheeler is a 57 y.o. female  Roomed by: Claire WEN     Refills needed? No    Do you have any forms that need to be filled out? No      Chief Complaint   Patient presents with     Medication Reaction     took naltrexone this morning and had an anxiety attack.      Nausea     nausea started this afternoon   Patient admits that she has a history of anxiety and is on a lot of psych meds. Started 1/2 tab of naltrexone ( Depade) 50 mg this morning prescribed at her weight loss clinic..  Says that within 2 hours started having panic attacks and nausea. Denies any CP, SOB or trouble swallowing.  Patient is worried that she cannot take her other medications tonight.   Review of Systems  Cor - Psych - see HPI  Allergies   Allergen Reactions     Protonix [Pantoprazole] Nausea And Vomiting     Abilify [Aripiprazole] Other (See Comments)     seizure     Albuterol Other (See Comments)     shakey     Geodon [Ziprasidone Hcl] Other (See Comments)     psychosis     Saphris [Asenapine] Unknown     Demerol [Meperidine] Other (See Comments)     lightheaded     Naproxen Palpitations     Omeprazole Palpitations     Palpitations with dosage above 10 mg     Septra [Sulfamethoxazole-Trimethoprim] Unknown     Possibly caused a UTI?       Current Outpatient Prescriptions:      albuterol (PROVENTIL HFA;VENTOLIN HFA) 90 mcg/actuation inhaler, Inhale 2 puffs 4 (four) times a day as needed for wheezing or shortness of breath., Disp: , Rfl:      benztropine (COGENTIN) 1 MG tablet, , Disp: , Rfl:      dicyclomine (BENTYL) 20 mg tablet, Take 20 mg by mouth 4 (four) times a day as needed., Disp: , Rfl:      gabapentin (NEURONTIN) 300 MG capsule, Take 600 mg by mouth bedtime. , Disp: , Rfl:      hydrochlorothiazide (HYDRODIURIL) 25 MG tablet, Take 25 mg by mouth bedtime. , Disp: , Rfl:      mometasone (NASONEX) 50 mcg/actuation nasal spray, 2 sprays into each nostril daily as needed. , Disp: , Rfl:      montelukast  (SINGULAIR) 10 mg tablet, Take 10 mg by mouth daily., Disp: , Rfl:      perphenazine 8 MG tablet, , Disp: , Rfl:      pravastatin (PRAVACHOL) 40 MG tablet, , Disp: , Rfl:      ranitidine (ZANTAC) 150 MG tablet, Take 150 mg by mouth bedtime as needed for heartburn. , Disp: , Rfl:      sertraline (ZOLOFT) 100 MG tablet, Take 200 mg by mouth daily. , Disp: , Rfl:      traZODone (DESYREL) 100 MG tablet, Take 100 mg by mouth bedtime., Disp: , Rfl:      naltrexone (DEPADE) 50 mg tablet, Days 0-10: Half a tablet in the am. Days 11 and up: Half tablet in am and half tablet in pm. Do not mix with narcotics., Disp: 45 tablet, Rfl: 0  There is no problem list on file for this patient.    Medical History Reviewed  Objective:     Vitals:    06/22/17 1910 06/22/17 1925   BP: 140/78 128/76   Patient Site: Left Arm Left Arm   Patient Position: Sitting Sitting   Cuff Size: Adult Large Adult Large   Pulse: 82    Temp: 97.9  F (36.6  C)    TempSrc: Oral    SpO2: 97%    Weight: (!) 253 lb 11.2 oz (115.1 kg)    General  - patient looks anxious, but not in distress  Cor-regular rate and rhythm without murmur  Lungs good air entry with clear to auscultation bilaterally  Psych - Affect - very anxious, well groomed, speech not pressured, good insight, no flight of ideas    No results found for this visit on 06/22/17.  No results found.   Assessment - Plan   Medical Decision Making - Phoned [facility] and spoke with their in hospital pharmacist.  Reviewed Micromedex information in pharmacology section.  Naltrexone's oral half-life is 4 hours.  Discussed with patient that it takes 5 half-lives for medication to get out of one system so the naltrexone should be out of her system within 24 hours.  Told patient she could take her medications tonight.  Discontinued her naltrexone and added it as an as an allergy.    1. Medication side effect, initial encounter     2. Anxiety     3. Schizoaffective disorder       At the conclusion of the  encounter, assessment and plan were discussed.   All questions were answered.   The patient or guardian acknowledged understanding and was involved in the decision making regarding the overall care plan.    Patient Instructions   1. The naltrexone should be out of your system within 24 hours  2. You may take your other medication today  3. Follow up with your primary physician at earliest available opening  4. Book is called [employer] by Jessica Anaya  5. If symptoms are getting worse over time or you have any questions, call the clinic number

## 2021-06-16 PROBLEM — E11.9 DIABETES MELLITUS, TYPE 2 (H): Status: ACTIVE | Noted: 2019-03-01

## 2021-06-16 PROBLEM — K58.9 IRRITABLE BOWEL SYNDROME: Status: ACTIVE | Noted: 2018-11-04

## 2021-06-16 PROBLEM — Z87.442 HISTORY OF NEPHROLITHIASIS: Status: ACTIVE | Noted: 2019-03-01

## 2021-06-16 PROBLEM — M25.50 POLYARTHRALGIA: Status: ACTIVE | Noted: 2018-05-29

## 2021-06-16 PROBLEM — M17.0 BILATERAL PRIMARY OSTEOARTHRITIS OF KNEE: Status: ACTIVE | Noted: 2018-05-29

## 2021-06-16 PROBLEM — R55 NEAR SYNCOPE: Status: ACTIVE | Noted: 2019-03-01

## 2021-06-16 PROBLEM — J45.909 ASTHMA: Status: ACTIVE | Noted: 2018-11-04

## 2021-06-16 PROBLEM — N39.0 URINARY TRACT INFECTION: Status: ACTIVE | Noted: 2019-03-01

## 2021-06-16 PROBLEM — E87.6 HYPOKALEMIA: Status: ACTIVE | Noted: 2019-03-01

## 2021-06-17 NOTE — TELEPHONE ENCOUNTER
Triage Call:  Patient wanted to know if she needed to wear a mask still if she is fully vaccinated. Home care advise given.      COVID 19 Nurse Triage Plan/Patient Instructions    Please be aware that novel coronavirus (COVID-19) may be circulating in the community. If you develop symptoms such as fever, cough, or SOB or if you have concerns about the presence of another infection including coronavirus (COVID-19), please contact your health care provider or visit  [email or web address].    Disposition/Instructions    Home care recommended. Follow home care protocol based instructions.    Thank you for taking steps to prevent the spread of this virus.  o Limit your contact with others.  o Wear a simple mask to cover your cough.  o Wash your hands well and often.    Resources    [facility]: About COVID-19: [email or web address]    CDC: What to Do If You're Sick: www.cdc.gov/coronavirus/2019-ncov/about/steps-when-sick.html    CDC: Ending Home Isolation: www.cdc.gov/coronavirus/2019-ncov/hcp/disposition-in-home-patients.html     CDC: Caring for Someone: www.cdc.gov/coronavirus/2019-ncov/if-you-are-sick/care-for-someone.html     [facility]: Interim Guidance for Hospital Discharge to Home: www.health.[email or web address]/coronavirus/hcp/hospdischarge.pdf    [facility] clinical trials (COVID-19 research studies): [email or web address]-clinical-trials     Below are the COVID-19 hotlines at the [facility] of Health ([facility]). Interpreters are available.   o For health questions: Call 602-496-9567 or 1-307.648.8382 (7 a.m. to 7 p.m.)  o For questions about schools and childcare: Call 080-811-7202 or 1-231.120.6802 (7 a.m. to 7 p.m.)       Christina Antonio RN Nurse Triage 5/18/2021 3:53 PM     Reason for Disposition    COVID-19 vaccine and fully vaccinated (What can I do now?), Frequently Asked Questions (FAQs)    Additional Information    Negative: [1] Difficulty breathing or  swallowing AND [2] starts within 2 hours after injection    Negative: Sounds like a life-threatening emergency to the triager    Negative: [1] Has NOT completed COVID-19 vaccine series AND [2] COVID-19 exposure AND [2] AND [3] typical COVID-19 symptoms    Negative: [1] Has NOT completed COVID-19 vaccine series AND [2]  COVID-19 exposure AND [3] no symptoms    Negative: [1] COVID-19 vaccine series completed (fully vaccinated) in past 3 months AND [2] new-onset of COVID-19 symptoms BUT [3] no known exposure    Negative: [1] Typical COVID-19 symptoms AND [2] symptoms that are NOT expected from vaccine (e.g., cough, difficulty breathing, loss of taste or smell, runny nose, sore throat)    Negative: [1] Typical COVID-19 symptoms AND [2] started > 3 days after getting vaccine    Negative: Fever > 104 F (40 C)    Negative: Sounds like a severe, unusual reaction to the triager    Negative: [1] Redness or red streak around the injection site AND [2] started > 48 hours after getting vaccine AND [3] fever    Negative: [1] Fever > 101 F (38.3 C) AND [2] age > 60 years AND [3] started > 48 hours after getting vaccine    Negative: [1] Fever > 100.0 F (37.8 C) AND [2] bedridden (e.g., nursing home patient, CVA, chronic illness, recovering from surgery) AND [3] started > 48 hours after getting vaccine    Negative: [1] Fever > 100.0 F (37.8 C) AND [2] diabetes mellitus or weak immune system (e.g., HIV positive, cancer chemo, splenectomy, organ transplant, chronic steroids) AND [3] started > 48 hours after getting vaccine    Negative: [1] Redness or red streak around the injection site AND [2] started > 48 hours after getting vaccine AND [3] no fever  (Exception: red area < 1 inch or 2.5 cm wide)    Negative: [1] Pain, tenderness, or swelling at the injection site AND [2] over 3 days (72 hours) since vaccine AND [3] getting worse    Negative: Fever > 100.0 F (37.8 C) present > 3 days (72 hours)    Negative: [1] Fever > 100.0 F (37.8  C) AND [2] healthcare worker    Negative: [1] Pain, tenderness, or swelling at the injection site AND [2] lasts > 7 days    Negative: [1] Lymph node swelling (i.e., armpit or neck on side of vaccine) AND [2] lasts > 3 weeks    Negative: [1] Requesting COVID-19 vaccine AND [2] healthcare worker (e.g., EMS first responders, doctors, nurses)    Negative: [1] Requesting COVID-19 vaccine AND [2] resident of a long-term care facility (e.g., nursing home)    Negative: [1] Requesting COVID-19 vaccine AND [2] vaccine available in the community for this patient group    Negative: COVID-19 vaccine, injection site reaction (e.g., pain, redness, swelling), question about    Protocols used: CORONAVIRUS (COVID-19) VACCINE QUESTIONS AND [id number]-MG 3.25.21

## 2021-06-18 NOTE — PROGRESS NOTES
ASSESSMENT AND PLAN:  Mayra Wheeler 58 y.o. female is seen here on 05/29/18 for evaluation of arthralgias.  Her main concern is pain around her knees and less in the ankles.  1 of her key questions of if she has fibromyalgia.  The likelihood of that appears to be low.  More likely than not some of the features are in keeping with osteoarthritis.  I recommended taking x-rays of the knees and other labs as noted.  She is inclined to defer x-rays of the knees.  As she does not like x-rays.  She has been informed in the past that she may have osteoarthritis.  We discussed the management principles there off.  She is already working on weight reduction and activity.  She can have corticosteroid injections either here or at her primary physician's office.  I have given her [employer] handout on osteoarthritis to review.  Diagnoses and all orders for this visit:    Polyarthralgia  -     XR Knees Bilateral 1 Or 2 VWS; Future; Expected date: 5/29/18  -     Rheumatoid Factor Quant  -     CCP Antibodies  -     Hepatitis C Antibody (Anti-HCV)  -     Uric Acid  -     Antinuclear Antibody (MANSOOR) Cascade  -     Hepatitis B Surface Antigen (HBsAG)  -     XR Knees Bilateral 1 Or 2 VWS    Bilateral primary osteoarthritis of knee      HISTORY OF PRESENTING ILLNESS:  Mayra Wheeler, 58 y.o., female is here for evaluation of joint pains.  1 of her main concerns is if she has fibromyalgia.  She reports that the worst of her symptoms are in her knees, ankles and knees troubled her more than the ankles.  Typically this can be worse in the morning.  Also worse with activity.  She has not observed any swelling.  Last summer she had corticosteroid injections in the knee into the right side.  That provided her good relief.  She is also had low back pain.  That has gone on for much longer.  The knee and ankle as his symptoms are not associated with upper extremity involvement such as the hands or wrists the elbows but  "the shoulders.  No history of recent trauma.  At one point she was thought to have gout.  She has noted ongoing fatigue, ringing in the ears, dryness of mouth, hoarseness, she gets heartburn especially with stressful situations, she has history of some muscle spasms, her morning stiffness she noted is no more than 15 minutes, she is not a smoker does not take alcohol.  She likes to go walking and biking regularly.  There is no family history of psoriasis ankylosing spondylitis rheumatoid arthritis lupus ulcerative colitis or Crohn's disease.  She noted that she and many of her children has irritable bowel syndrome.  She describes history of \"nervous breakdown, that led to her disability she follows up at a psychiatrist currently.  She has not tried ibuprofen or Aleve or Tylenol over-the-counter for her joint symptoms.  She describes herself as a person who is typically reluctant to take medications.    Further historical information and ADL limitations as noted in the multidimensional health assessment questionnaire attached in the EMR. Rest of the 13 system ROS is negative.     ALLERGIES:Naltrexone; Protonix [pantoprazole]; Abilify [aripiprazole]; Albuterol; Geodon [ziprasidone hcl]; Saphris [asenapine]; Demerol [meperidine]; Naproxen; Omeprazole; and Septra [sulfamethoxazole-trimethoprim]    PAST MEDICAL/ACTIVE PROBLEMS/MEDICATION/ FAMILY HISTORY/SOCIAL DATA:  The patient has a family history of  Past Medical History:   Diagnosis Date     Anxiety      Arthritis     osteroarthritis of knees and low back pain     Asthma      Chronic kidney disease     stones, needing removal.     Depression      Fatty liver      GERD (gastroesophageal reflux disease)      Hyperlipidemia      Hypertension      IBS (irritable bowel syndrome)      Kidney stone      Kidney stones      Schizoaffective disorder      Schizoaffective disorder      Seizures     reaction to abilify     Sleep apnea     uses cpap, broke last night.     History "   Smoking Status     Former Smoker     Quit date: 1/1/1997   Smokeless Tobacco     Never Used     Comment: Quit 15-20 years ago     Patient Active Problem List   Diagnosis     Polyarthralgia     Current Outpatient Prescriptions   Medication Sig Dispense Refill     albuterol (PROVENTIL HFA;VENTOLIN HFA) 90 mcg/actuation inhaler Inhale 2 puffs 4 (four) times a day as needed for wheezing or shortness of breath.       benztropine (COGENTIN) 1 MG tablet        dicyclomine (BENTYL) 20 mg tablet Take 20 mg by mouth 4 (four) times a day as needed.       gabapentin (NEURONTIN) 300 MG capsule Take 600 mg by mouth bedtime.        hydrochlorothiazide (HYDRODIURIL) 25 MG tablet Take 25 mg by mouth bedtime.        mometasone (NASONEX) 50 mcg/actuation nasal spray 2 sprays into each nostril daily as needed.        montelukast (SINGULAIR) 10 mg tablet Take 10 mg by mouth daily.       perphenazine 8 MG tablet        pravastatin (PRAVACHOL) 40 MG tablet        ranitidine (ZANTAC) 150 MG tablet Take 150 mg by mouth bedtime as needed for heartburn.        sertraline (ZOLOFT) 100 MG tablet Take 200 mg by mouth daily.        traZODone (DESYREL) 100 MG tablet Take 100 mg by mouth bedtime.       No current facility-administered medications for this visit.        COMPREHENSIVE EXAMINATION:  Vitals:    05/29/18 1351   BP: 126/64   Patient Site: Right Arm   Patient Position: Sitting   Cuff Size: Adult Regular   Pulse: 68   Weight: (!) 250 lb (113.4 kg)     A well appearing alert oriented female. Vital data as noted above. Her eyes without inflammation/scleromalacia. ENTwithout oral mucositis, thrush, nasal deformity, external ear redness, deformity. Her neck is without lymphadenopathy and supple. Lungs normal sounds, no pleural rub. Heart auscultation normal rate, rhythm; no pericardial rub and murmurs. Abdomen soft, non tender, no organomegaly. Skin examined for heliotrope, malar area eruption, lupus pernio, periungual erythema,  sclerodactyly, papules, erythema nodosum, purpura, nail pitting, onycholysis, and obvious psoriasis lesion. Neurological examination shows normal alertness, speech, facial symmetry, tone and power in upper and lower extremities, Tinel's and Phalen's at wrist and gait. The joint examination is performed for swelling, tenderness, warmth, erythema, and range of motion in the following joints: DIPs, PIPs, MCPs, wrists, first CMC's, elbows, shoulders, hips, knees, ankles, feet; spine for range of motion and paraspinal muscles for tenderness. The salient normal / abnormal findings are appended.  She does not have synovitis in any of the palpable appendical joints.  She is tender in the joint lines bilaterally in the knees.  She has crepitation bilaterally.  There is no clinically detectable effusions.  There is no deficit dactylitis, enthesitis.  Her nails are painted.  There is no tenderness in the proximal upper or lower extremities there is no tenderness across the trapezius paraspinal region.  LAB / IMAGING DATA:  ALT   Date Value Ref Range Status   02/22/2017 57 (H) 0 - 45 U/L Final   01/20/2016 34 0 - 45 U/L Final   02/02/2015 34 0 - 45 U/L Final     Albumin   Date Value Ref Range Status   02/22/2017 3.9 3.5 - 5.0 g/dL Final   01/20/2016 4.2 3.5 - 5.0 g/dL Final   02/02/2015 4.1 3.5 - 5.0 g/dL Final     Creatinine   Date Value Ref Range Status   02/22/2017 0.69 0.60 - 1.10 mg/dL Final   02/04/2016 0.91 0.60 - 1.10 mg/dL Final   01/27/2016 0.73 0.60 - 1.10 mg/dL Final       WBC   Date Value Ref Range Status   07/16/2017 8.7 4.0 - 11.0 thou/uL Final   02/04/2016 12.5 (H) 4.0 - 11.0 thou/uL Final     Hemoglobin   Date Value Ref Range Status   07/16/2017 14.6 12.0 - 16.0 g/dL Final   02/04/2016 15.0 12.0 - 16.0 g/dL Final   01/27/2016 12.8 12.0 - 16.0 g/dL Final     Platelets   Date Value Ref Range Status   07/16/2017 215 140 - 440 thou/uL Final   02/04/2016 300 140 - 440 thou/uL Final   01/27/2016 206 140 - 440 thou/uL  Final       No results found for: MANSOOR, RF, SEDRATE

## 2021-06-21 NOTE — PROGRESS NOTES
Subjective:   Mayra Wheeler is a(n) 58 y.o. White or  female who presents to [facility] with the following complaint(s):  Mass (mass in middle of chest between breasts x1 month, )    History of Present Illness:  Has noted a lump on the lower chest / upper abdomen for over a month. Mass has not enlarged. Mass is not painful. No associated skin changes. No known injury to the area. Reports that she has lost over 50 lbs over the past 3 months with diet and exercise. Had a lipoma removed from her left side of her abdomen in the past.     The following portions of the patient's history were reviewed and updated as appropriate: allergies, current medications, past family history, past medical history, past social history, past surgical history and problem list.    Review of Systems:   Review of Systems   All other systems reviewed and are negative.    Objective:     Vitals:    11/04/18 1508   BP: 144/72   Pulse: 76   Resp: 14   SpO2: 97%   Weight: 206 lb 9.6 oz (93.7 kg)     Physical Exam   Constitutional: She is oriented to person, place, and time. She appears well-developed and well-nourished.  Non-toxic appearance. No distress.   Cardiovascular: Normal rate, regular rhythm, S1 normal and S2 normal. Exam reveals no gallop and no friction rub.   No murmur heard.  Pulmonary/Chest: Effort normal and breath sounds normal. No stridor. She has no wheezes. She has no rhonchi. She has no rales. She exhibits deformity (prominent xiphoid process). She exhibits no tenderness and no edema.   Abdominal: Soft. Bowel sounds are normal. She exhibits no mass. There is no tenderness.   Neurological: She is alert and oriented to person, place, and time.   Skin: Skin is warm and dry. No rash noted. No pallor.   Nursing note and vitals reviewed.    Laboratory:  N/A    Radiology:  N/A    Electrocardiogram:  N/A    Assessment/Plan   1. Prominent xiphoid    - Advised patient that the mass she has identified on her lower anterior  chest is a prominent xiphoid process. Discussed the benign nature of this. Offered to complete an x-ray to verify this diagnosis but the patient declined.   - Counseled patient regarding assessment and plan for evaluation and treatment. Questions were answered. See AVS for the specific written instructions that were provided at the conclusion of the visit.   - Discussed signs / symptoms that warrant urgent / emergent medical attention.   - Follow up as needed.     Kedar Cerna MD

## 2021-06-28 NOTE — PROGRESS NOTES
"Progress Notes by Gilda Marsh AuD at 11/22/2019  2:00 PM     Author: Gilda Marsh AuD Service: -- Author Type: Audiologist    Filed: 11/22/2019  2:31 PM Encounter Date: 11/22/2019 Status: Signed    : Gilda Marsh AuD (Audiologist)       Audiology Report    Referring Provider:  Dr. Springer    History:  Mayra Wheeler is seen in conjunction with ENT appointment today. Summary: Audiology visit completed. Please see audiogram below or in \"media\" tab for case history and results.     Results:   Transducer: Insert earphones and Circumaural headphones    Reliability was good  and there was good  SRT to PTA agreement.       Plan:  The patient is returned to ENT for follow up.  She should return for retesting with ENT recommendation.     Vazquez Sena, CCC-A  [place] Licensed Audiologist #[id number]                 "

## 2021-07-03 NOTE — ADDENDUM NOTE
Addendum Note by John Sandhu MD at 7/16/2017  3:08 PM     Author: John Sandhu MD Service: -- Author Type: Physician    Filed: 7/16/2017  3:08 PM Encounter Date: 7/16/2017 Status: Signed    : John Sandhu MD (Physician)    Addended by: JOHN SANDHU on: 7/16/2017 03:08 PM        Modules accepted: Orders

## 2021-07-03 NOTE — ADDENDUM NOTE
Addendum Note by Helen Obrien RN at 5/31/2018 12:48 PM     Author: Helen Obrien RN Service: -- Author Type: Registered Nurse    Filed: 5/31/2018 12:48 PM Encounter Date: 5/29/2018 Status: Signed    : Helen Obrien RN (Registered Nurse)    Addended by: HELEN OBRIEN on: 5/31/2018 12:48 PM        Modules accepted: Orders

## 2021-08-22 ENCOUNTER — HEALTH MAINTENANCE LETTER (OUTPATIENT)
Age: 61
End: 2021-08-22

## 2021-09-22 ENCOUNTER — HOSPITAL ENCOUNTER (EMERGENCY)
Facility: HOSPITAL | Age: 61
Discharge: HOME | End: 2021-09-23
Attending: EMERGENCY MEDICINE | Admitting: EMERGENCY MEDICINE
Payer: COMMERCIAL

## 2021-09-22 DIAGNOSIS — R53.83 FATIGUE, UNSPECIFIED TYPE: ICD-10-CM

## 2021-09-22 PROBLEM — Z90.710 STATUS POST HYSTERECTOMY: Status: ACTIVE | Noted: 2021-09-22

## 2021-09-22 PROBLEM — I10 BENIGN ESSENTIAL HYPERTENSION: Status: ACTIVE | Noted: 2021-09-22

## 2021-09-22 PROBLEM — F41.9 ANXIETY: Status: ACTIVE | Noted: 2021-09-22

## 2021-09-22 LAB
ANION GAP SERPL CALCULATED.3IONS-SCNC: 8 MMOL/L (ref 5–18)
BUN SERPL-MCNC: 11 MG/DL (ref 8–22)
CALCIUM SERPL-MCNC: 9.7 MG/DL (ref 8.5–10.5)
CHLORIDE BLD-SCNC: 105 MMOL/L (ref 98–107)
CO2 SERPL-SCNC: 23 MMOL/L (ref 22–31)
CREAT SERPL-MCNC: 0.66 MG/DL (ref 0.6–1.1)
D DIMER PPP FEU-MCNC: 0.28 UG/ML FEU (ref 0–0.5)
ERYTHROCYTE [DISTWIDTH] IN BLOOD BY AUTOMATED COUNT: 12.1 % (ref 10–15)
GFR SERPL CREATININE-BSD FRML MDRD: >90 ML/MIN/1.73M2
GLUCOSE BLD-MCNC: 122 MG/DL (ref 70–125)
HCT VFR BLD AUTO: 39.7 % (ref 35–47)
HGB BLD-MCNC: 13.7 G/DL (ref 11.7–15.7)
INR PPP: 0.91 (ref 0.9–1.15)
MCH RBC QN AUTO: 29.4 PG (ref 26.5–33)
MCHC RBC AUTO-ENTMCNC: 34.5 G/DL (ref 31.5–36.5)
MCV RBC AUTO: 85 FL (ref 78–100)
PLATELET # BLD AUTO: 212 10E3/UL (ref 150–450)
POTASSIUM BLD-SCNC: 3.6 MMOL/L (ref 3.5–5)
RBC # BLD AUTO: 4.66 10E6/UL (ref 3.8–5.2)
SODIUM SERPL-SCNC: 136 MMOL/L (ref 136–145)
TROPONIN I SERPL-MCNC: <0.01 NG/ML (ref 0–0.29)
WBC # BLD AUTO: 9.6 10E3/UL (ref 4–11)

## 2021-09-22 PROCEDURE — 93005 ELECTROCARDIOGRAM TRACING: CPT

## 2021-09-22 PROCEDURE — 96361 HYDRATE IV INFUSION ADD-ON: CPT

## 2021-09-22 PROCEDURE — 96374 THER/PROPH/DIAG INJ IV PUSH: CPT

## 2021-09-22 PROCEDURE — 93005 ELECTROCARDIOGRAM TRACING: CPT | Performed by: EMERGENCY MEDICINE

## 2021-09-22 PROCEDURE — 99284 EMERGENCY DEPT VISIT MOD MDM: CPT | Mod: 25

## 2021-09-22 PROCEDURE — 36415 COLL VENOUS BLD VENIPUNCTURE: CPT | Performed by: EMERGENCY MEDICINE

## 2021-09-22 PROCEDURE — 85379 FIBRIN DEGRADATION QUANT: CPT | Performed by: EMERGENCY MEDICINE

## 2021-09-22 PROCEDURE — 87635 SARS-COV-2 COVID-19 AMP PRB: CPT | Performed by: EMERGENCY MEDICINE

## 2021-09-22 PROCEDURE — 85610 PROTHROMBIN TIME: CPT | Performed by: EMERGENCY MEDICINE

## 2021-09-22 PROCEDURE — C9803 HOPD COVID-19 SPEC COLLECT: HCPCS

## 2021-09-22 PROCEDURE — 84484 ASSAY OF TROPONIN QUANT: CPT | Performed by: EMERGENCY MEDICINE

## 2021-09-22 PROCEDURE — 85027 COMPLETE CBC AUTOMATED: CPT | Performed by: EMERGENCY MEDICINE

## 2021-09-22 PROCEDURE — 80048 BASIC METABOLIC PNL TOTAL CA: CPT | Performed by: EMERGENCY MEDICINE

## 2021-09-22 ASSESSMENT — MIFFLIN-ST. JEOR: SCORE: 1502.09

## 2021-09-23 VITALS
WEIGHT: 204 LBS | OXYGEN SATURATION: 97 % | HEART RATE: 92 BPM | RESPIRATION RATE: 28 BRPM | TEMPERATURE: 98.1 F | HEIGHT: 66 IN | BODY MASS INDEX: 32.78 KG/M2 | DIASTOLIC BLOOD PRESSURE: 114 MMHG | SYSTOLIC BLOOD PRESSURE: 156 MMHG

## 2021-09-23 LAB
ATRIAL RATE - MUSE: 86 BPM
DIASTOLIC BLOOD PRESSURE - MUSE: 81 MMHG
INTERPRETATION ECG - MUSE: NORMAL
P AXIS - MUSE: 69 DEGREES
PR INTERVAL - MUSE: 216 MS
QRS DURATION - MUSE: 96 MS
QT - MUSE: 388 MS
QTC - MUSE: 464 MS
R AXIS - MUSE: -56 DEGREES
SARS-COV-2 RNA RESP QL NAA+PROBE: NEGATIVE
SYSTOLIC BLOOD PRESSURE - MUSE: 177 MMHG
T AXIS - MUSE: 36 DEGREES
VENTRICULAR RATE- MUSE: 86 BPM

## 2021-09-23 PROCEDURE — 258N000003 HC RX IP 258 OP 636: Performed by: EMERGENCY MEDICINE

## 2021-09-23 PROCEDURE — 250N000011 HC RX IP 250 OP 636: Performed by: EMERGENCY MEDICINE

## 2021-09-23 RX ORDER — ONDANSETRON 2 MG/ML
4 INJECTION INTRAMUSCULAR; INTRAVENOUS ONCE
Status: COMPLETED | OUTPATIENT
Start: 2021-09-23 | End: 2021-09-23

## 2021-09-23 RX ADMIN — SODIUM CHLORIDE 1000 ML: 9 INJECTION, SOLUTION INTRAVENOUS at 00:17

## 2021-09-23 RX ADMIN — ONDANSETRON 4 MG: 2 INJECTION INTRAMUSCULAR; INTRAVENOUS at 00:18

## 2021-09-23 NOTE — ED PROVIDER NOTES
EMERGENCY DEPARTMENT ENCOUNTER      FINAL IMPRESSION    1. Fatigue, unspecified type        MEDICAL DECISION MAKING    61-year-old female presented to the ED with generalized fatigue and malaise presyncope.  Vitals on arrival with hypertension.  Examination otherwise is grossly reassuring no focal neurologic findings do not suspect an acute intracranial process at this time.  Some concern for hypertensive urgency versus mild symptomatic dehydration versus electrolyte metabolic derangement versus occult anemia.  Lower suspicion for cardiac dysrhythmia nor ACS or dissection.     IV established lab work is sent is noted grossly reassuring.  Negative troponin negative D-dimer no suspicion for PE.     Patient given IV fluids analgesics and antiemetics with good results.     EKG is grossly reassuring.     Ultimately I do believe safe for discharge at this time with grossly reassuring work-up.     At the conclusion of the encounter I discussed the results of all of the tests and the disposition. All questions were answered. The patient and or family acknowledged understanding and was involved in the decision making regarding the overall care plan. I discussed the utility, limitations and findings of the exam/interventions/studies done during this visit as well as the list of differential diagnosis and symptoms for which to monitor/return to ED. Verbalizes understanding.    Due to contagious pathogen concern full protective equipment was utilized through the duration of the interaction including N95  mask, eye shield, hair cover, gown and gloves.     MEDICATIONS GIVEN IN THE EMERGENCY:  Medications   0.9% sodium chloride BOLUS (1,000 mLs Intravenous New Bag 9/23/21 0017)   ondansetron (ZOFRAN) injection 4 mg (4 mg Intravenous Given 9/23/21 0018)       NEW PRESCRIPTIONS STARTED AT TODAY'S ER VISIT     Review of your medicines      UNREVIEWED medicines. Ask your doctor about these medicines      Dose / Directions   albuterol  108 (90 Base) MCG/ACT inhaler  Commonly known as: PROAIR HFA/PROVENTIL HFA/VENTOLIN HFA      Dose: 2 puff  [ALBUTEROL (PROVENTIL HFA;VENTOLIN HFA) 90 MCG/ACTUATION INHALER] Inhale 2 puffs 4 (four) times a day as needed for wheezing or shortness of breath.  Refills: 0     benztropine 1 MG tablet  Commonly known as: COGENTIN      Dose: 1 mg  [BENZTROPINE (COGENTIN) 1 MG TABLET] Take 1 mg by mouth at bedtime.  Refills: 0     gabapentin 300 MG capsule  Commonly known as: NEURONTIN      Dose: 600 mg  [GABAPENTIN (NEURONTIN) 300 MG CAPSULE] Take 600 mg by mouth bedtime.  Refills: 0     hydrochlorothiazide 25 MG tablet  Commonly known as: HYDRODIURIL      Dose: 25 mg  [HYDROCHLOROTHIAZIDE (HYDRODIURIL) 25 MG TABLET] Take 25 mg by mouth bedtime.  Refills: 0     metFORMIN 500 MG tablet  Commonly known as: GLUCOPHAGE      Dose: 1,000 mg  [METFORMIN (GLUCOPHAGE) 500 MG TABLET] Take 1,000 mg by mouth 2 (two) times a day.  Refills: 0     montelukast 10 MG tablet  Commonly known as: SINGULAIR      Dose: 10 mg  [MONTELUKAST (SINGULAIR) 10 MG TABLET] Take 10 mg by mouth daily.  Refills: 0     perphenazine 8 MG tablet      Dose: 8 mg  [PERPHENAZINE 8 MG TABLET] Take 8 mg by mouth at bedtime.  Refills: 0     pravastatin 40 MG tablet  Commonly known as: PRAVACHOL      Dose: 40 mg  [PRAVASTATIN (PRAVACHOL) 40 MG TABLET] Take 40 mg by mouth at bedtime.  Refills: 0     sertraline 100 MG tablet  Commonly known as: ZOLOFT      Dose: 200 mg  [SERTRALINE (ZOLOFT) 100 MG TABLET] Take 200 mg by mouth daily.  Refills: 0     traZODone 100 MG tablet  Commonly known as: DESYREL      Dose: 100 mg  [TRAZODONE (DESYREL) 100 MG TABLET] Take 100 mg by mouth bedtime.  Refills: 0                CHIEF COMPLAINT    Chief Complaint   Patient presents with     Nausea     Fatigue         HPI    Mayra Wheeler is a 61 year old female with pertinent past medical history for type 2 diabetes mellitus, hypertension, hyperlipidemia, GERD, IBS, and schizoaffective  "disorder who presents to this Emergency Department for evaluation of nausea and light headedness.    The patient reports that she visited the dentist today (9/22) and afterwards she developed nausea with light headedness that she describes as pre-syncopal in nature. She states she had similar symptoms ~1.5 weeks ago and was able to control it with fluids, but that has not worked this time. She has been drinking a significant amount and urinating as such but still feels dehydrated. She also notes that she has felt constipated tonight but has been having small bowel movements and describes it as \"not as bad\" as before. Additionally, the patient has been alternating between having chills and feeling hot. The patient denies abdominal pain, shortness of breath, tachycardia, chest pain, coughing, congestion, and any other symptoms or complaints at this time.     Of note, the patient is fully COVID-19 vaccinated.     This document serves as a record of services performed by Dr. Benoit Stewart. It was created on his behalf by Conner Paulino, trained medical scribe. The creation of this record is based on the scribes personal observations and the providers statements to him/her. This document has been checked and approved by the attending provider.    RELEVANT HISTORY, MEDICATIONS, & ALLERGIES   Past medical history, surgical history, family history, medications, and allergies reviewed and pertinent noted in HPI. See end of note for comprehensive list.    REVIEW OF SYSTEMS    Constitutional:  No fever. No weakness. Positive for chills and hot sensation.  ENT: No sore throat. No congestion  Eyes: No vision changes  Respiratory: No shortness of breath, no wheeze, no cough  Cardiovascular:  No chest pain, no palpitations, no syncope.  GI:  No abdominal pain, no vomiting, no diarrhea. Positive for nausea and constipation.  : No dysuria, No hematuria, No frequency.  Musculoskeletal:  No new muscle/joint pain, no swelling, no loss " "of function.   Skin:  No rash.  Neurologic:  No headache, no focal weakness , no sensory changes. Positive for light headedness.    All other systems reviewed and are negative.    PHYSICAL EXAM    VITALS SIGNS: BP (!) 156/114   Pulse 92   Temp 98.1  F (36.7  C) (Oral)   Resp 28   Ht 1.676 m (5' 6\")   Wt 92.5 kg (204 lb)   SpO2 97%   BMI 32.93 kg/m    Constitutional:  Awake, Alert, Cooperative.  HENT: Normocephalic, Atraumatic, Bilateral external ears normal, Oropharynx moist, Nose normal.   Neck: Normal range of motion, No tenderness, Supple, No meningismus, No stridor.   Eyes: PERRL, EOMI, Conjunctiva normal, No discharge.   Respiratory: Normal breath sounds, No respiratory distress, No wheezing, No chest tenderness.   Cardiovascular: Normal heart rate, Normal rhythm, No murmurs, No rubs, No gallops.   GI: Soft, No tenderness, No guarding, No CVA tenderness.   Musculoskeletal: Neurovascularly intact distally, No edema, No tenderness  Integument: Warm, Dry, No erythema, No rash.   Neurologic: Alert & oriented x 3, Normal motor function, Normal sensory function, No focal deficits noted. GCS-15. Finger-nose intact.    LABS  Labs Ordered and Resulted from Time of ED Arrival Up to the Time of Departure from the ED   CBC WITH PLATELETS - Normal   BASIC METABOLIC PANEL - Normal   INR - Normal   D DIMER QUANTITATIVE - Normal    Narrative:     This D-dimer assay is intended for use in conjunction with a clinical pretest probability assessment model to exclude pulmonary embolism (PE) and deep venous thrombosis (DVT) in outpatients suspected of PE or DVT. The cut-off value is 0.50 ug/mL FEU.   TROPONIN I - Normal   COVID-19 VIRUS (CORONAVIRUS) BY PCR - Normal    Narrative:     Testing was performed using the arturo  SARS-CoV-2 & Influenza A/B Assay on the arturo  Kim  System.  This test should be ordered for the detection of SARS-COV-2 in individuals who meet SARS-CoV-2 clinical and/or epidemiological criteria. Test " performance is unknown in asymptomatic patients.  This test is for in vitro diagnostic use under the FDA EUA for laboratories certified under CLIA to perform moderate and/or high complexity testing. This test has not been FDA cleared or approved.  A negative test does not rule out the presence of PCR inhibitors in the specimen or target RNA in concentration below the limit of detection for the assay. The possibility of a false negative should be considered if the patient's recent exposure or clinical presentation suggests COVID-19.  [facility] are certified under the Clinical Laboratory Improvement Amendments of  (CLIA-88) as qualified to perform moderate and/or high complexity laboratory testing.         EKG    Sinus rhythm at a rate of 86, cures 96, QTc 464 presents with findings not acute ischemic evidence.  No significant change compared EKG from 2020.    RADIOLOGY    Please see official radiology report.  No orders to display         All laboratories, imaging and EKG's were personally reviewed and interpreted by myself prior to disposition decision.     PROCEDURES    None    Comprehensive outline of EPIC chart Hx  PAST MEDICAL HISTORY    History reviewed. No pertinent past medical history.  Past Surgical History:   Procedure Laterality Date      SECTION        SECTION      1 time     COMBINED CYSTOSCOPY, INSERT STENT URETER(S) Right 2016    Procedure: CYSTOSCOPY, RIGHT URETEROSCOPY, HOLMIUM LASER LITHOTRIPSY AND RIGHT STENT PLACEMENT;  Surgeon: Keven Holland MD;  Location: [facility];  Service:      HYSTERECTOMY  1992     TUBAL LIGATION         CURRENT MEDICATIONS    No current facility-administered medications for this encounter.    Current Outpatient Medications:      albuterol (PROVENTIL HFA;VENTOLIN HFA) 90 mcg/actuation inhaler, [ALBUTEROL (PROVENTIL HFA;VENTOLIN HFA) 90 MCG/ACTUATION INHALER] Inhale 2 puffs 4 (four) times a day as  needed for wheezing or shortness of breath., Disp: , Rfl:      benztropine (COGENTIN) 1 MG tablet, [BENZTROPINE (COGENTIN) 1 MG TABLET] Take 1 mg by mouth at bedtime.       , Disp: , Rfl:      gabapentin (NEURONTIN) 300 MG capsule, [GABAPENTIN (NEURONTIN) 300 MG CAPSULE] Take 600 mg by mouth bedtime. , Disp: , Rfl:      hydrochlorothiazide (HYDRODIURIL) 25 MG tablet, [HYDROCHLOROTHIAZIDE (HYDRODIURIL) 25 MG TABLET] Take 25 mg by mouth bedtime. , Disp: , Rfl:      metFORMIN (GLUCOPHAGE) 500 MG tablet, [METFORMIN (GLUCOPHAGE) 500 MG TABLET] Take 1,000 mg by mouth 2 (two) times a day., Disp: , Rfl: 0     montelukast (SINGULAIR) 10 mg tablet, [MONTELUKAST (SINGULAIR) 10 MG TABLET] Take 10 mg by mouth daily., Disp: , Rfl:      perphenazine 8 MG tablet, [PERPHENAZINE 8 MG TABLET] Take 8 mg by mouth at bedtime.       , Disp: , Rfl:      pravastatin (PRAVACHOL) 40 MG tablet, [PRAVASTATIN (PRAVACHOL) 40 MG TABLET] Take 40 mg by mouth at bedtime.       , Disp: , Rfl:      sertraline (ZOLOFT) 100 MG tablet, [SERTRALINE (ZOLOFT) 100 MG TABLET] Take 200 mg by mouth daily. , Disp: , Rfl:      traZODone (DESYREL) 100 MG tablet, [TRAZODONE (DESYREL) 100 MG TABLET] Take 100 mg by mouth bedtime., Disp: , Rfl:     ALLERGIES    Allergies   Allergen Reactions     Naltrexone Anxiety     Protonix [Pantoprazole] Nausea and Vomiting     Abilify [Aripiprazole] Other (See Comments)     seizure     Albuterol Other (See Comments)     shakey     Geodon [Ziprasidone] Other (See Comments)     psychosis     Saphris [Asenapine] Unknown     Demerol [Meperidine] Other (See Comments)     lightheaded     Naproxen Palpitations     Omeprazole Palpitations     Palpitations with dosage above 10 mg     Septra [Sulfamethoxazole W/Trimethoprim] Unknown     Possibly caused a UTI?       FAMILY HISTORY    Family History   Problem Relation Age of Onset     Multiple Sclerosis Mother      Cancer Father      Hypertension Father      Diabetes Father      Cancer  Paternal Grandfather      Obesity Brother      Diabetes Brother      Hyperlipidemia Brother      Hypertension Brother        SOCIAL HISTORY    Social History     Socioeconomic History     Marital status:      Spouse name: Not on file     Number of children: Not on file     Years of education: Not on file     Highest education level: Not on file   Occupational History     Not on file   Tobacco Use     Smoking status: Former Smoker     Quit date: 1997     Years since quittin.7     Smokeless tobacco: Never Used     Tobacco comment: Quit 15-20 years ago   Substance and Sexual Activity     Alcohol use: No     Drug use: No     Sexual activity: Not Currently     Birth control/protection: Surgical     Comment: partial hysterectomy   Other Topics Concern     Not on file   Social History Narrative     Not on file     Social Determinants of Health     Financial Resource Strain:      Difficulty of Paying Living Expenses:    Food Insecurity:      Worried About Running Out of Food in the Last Year:      Ran Out of Food in the Last Year:    Transportation Needs:      Lack of Transportation (Medical):      Lack of Transportation (Non-Medical):    Physical Activity:      Days of Exercise per Week:      Minutes of Exercise per Session:    Stress:      Feeling of Stress :    Social Connections:      Frequency of Communication with Friends and Family:      Frequency of Social Gatherings with Friends and Family:      Attends [employer] Services:      Active Member of Clubs or Organizations:      Attends Club or Organization Meetings:      Marital Status:    Intimate Partner Violence:      Fear of Current or Ex-Partner:      Emotionally Abused:      Physically Abused:      Sexually Abused:        This document serves as a record of services performed by Dr. Benoit Stewart. It was created on his behalf by Conner Paulino, trained medical scribe. The creation of this record is based on the scribes personal observations and the  providers statements to him/her.     I Dr. Benoit Stewart, personally performed the services described in this documentation as scribed by the above named individual in my presence, and it is both accurate and complete.        Benoit Stewart MD  09/23/21 0201

## 2021-09-23 NOTE — DISCHARGE INSTRUCTIONS
Lab work today is all grossly reassuring.  Continue close follow-up with your primary doctor.    Return to the ED if symptoms worsen or new arise.

## 2021-09-23 NOTE — ED TRIAGE NOTES
Pt arrives via EMS for C/O of nausea, lightheadedness, and fatigue.  Pt states she was seen in the dentist office today and since has been feeling ill.  Pt states she thinks she is dehydrated but states she has been drinking water and peeing regularly.

## 2021-09-23 NOTE — ED NOTES
Bed: JNED-19  Expected date: 9/22/21  Expected time: 10:35 PM  Means of arrival: Ambulance  Comments:  [facility]

## 2021-10-17 ENCOUNTER — HEALTH MAINTENANCE LETTER (OUTPATIENT)
Age: 61
End: 2021-10-17

## 2022-02-06 ENCOUNTER — HEALTH MAINTENANCE LETTER (OUTPATIENT)
Age: 62
End: 2022-02-06

## 2022-04-03 ENCOUNTER — HEALTH MAINTENANCE LETTER (OUTPATIENT)
Age: 62
End: 2022-04-03

## 2022-04-19 ENCOUNTER — LAB REQUISITION (OUTPATIENT)
Dept: LAB | Facility: CLINIC | Age: 62
End: 2022-04-19

## 2022-04-19 DIAGNOSIS — E78.2 MIXED HYPERLIPIDEMIA: ICD-10-CM

## 2022-04-19 DIAGNOSIS — R53.83 OTHER FATIGUE: ICD-10-CM

## 2022-04-19 DIAGNOSIS — I10 ESSENTIAL (PRIMARY) HYPERTENSION: ICD-10-CM

## 2022-04-19 DIAGNOSIS — E55.9 VITAMIN D DEFICIENCY, UNSPECIFIED: ICD-10-CM

## 2022-04-19 LAB
ALBUMIN SERPL-MCNC: 4.2 G/DL (ref 3.5–5)
ALP SERPL-CCNC: 113 U/L (ref 45–120)
ALT SERPL W P-5'-P-CCNC: 30 U/L (ref 0–45)
ANION GAP SERPL CALCULATED.3IONS-SCNC: 13 MMOL/L (ref 5–18)
AST SERPL W P-5'-P-CCNC: 24 U/L (ref 0–40)
BILIRUB SERPL-MCNC: 0.4 MG/DL (ref 0–1)
BUN SERPL-MCNC: 7 MG/DL (ref 8–22)
CALCIUM SERPL-MCNC: 10 MG/DL (ref 8.5–10.5)
CHLORIDE BLD-SCNC: 103 MMOL/L (ref 98–107)
CHOLEST SERPL-MCNC: 205 MG/DL
CO2 SERPL-SCNC: 25 MMOL/L (ref 22–31)
CREAT SERPL-MCNC: 0.71 MG/DL (ref 0.6–1.1)
FASTING STATUS PATIENT QL REPORTED: ABNORMAL
GFR SERPL CREATININE-BSD FRML MDRD: >90 ML/MIN/1.73M2
GLUCOSE BLD-MCNC: 126 MG/DL (ref 70–125)
HDLC SERPL-MCNC: 78 MG/DL
LDLC SERPL CALC-MCNC: 93 MG/DL
POTASSIUM BLD-SCNC: 3.9 MMOL/L (ref 3.5–5)
PROT SERPL-MCNC: 7.7 G/DL (ref 6–8)
SODIUM SERPL-SCNC: 141 MMOL/L (ref 136–145)
TRIGL SERPL-MCNC: 169 MG/DL
TSH SERPL DL<=0.005 MIU/L-ACNC: 2.18 UIU/ML (ref 0.3–5)

## 2022-04-19 PROCEDURE — 80061 LIPID PANEL: CPT | Performed by: NURSE PRACTITIONER

## 2022-04-19 PROCEDURE — 82306 VITAMIN D 25 HYDROXY: CPT | Performed by: NURSE PRACTITIONER

## 2022-04-19 PROCEDURE — 80053 COMPREHEN METABOLIC PANEL: CPT | Performed by: NURSE PRACTITIONER

## 2022-04-19 PROCEDURE — 87086 URINE CULTURE/COLONY COUNT: CPT | Performed by: NURSE PRACTITIONER

## 2022-04-19 PROCEDURE — 84443 ASSAY THYROID STIM HORMONE: CPT | Performed by: NURSE PRACTITIONER

## 2022-04-20 LAB — DEPRECATED CALCIDIOL+CALCIFEROL SERPL-MC: 69 UG/L (ref 20–75)

## 2022-04-21 LAB — BACTERIA UR CULT: NORMAL

## 2022-07-15 ENCOUNTER — ANCILLARY PROCEDURE (OUTPATIENT)
Dept: MAMMOGRAPHY | Facility: CLINIC | Age: 62
End: 2022-07-15
Attending: FAMILY MEDICINE
Payer: COMMERCIAL

## 2022-07-15 DIAGNOSIS — Z12.31 SCREENING MAMMOGRAM, ENCOUNTER FOR: ICD-10-CM

## 2022-07-15 PROCEDURE — 77067 SCR MAMMO BI INCL CAD: CPT

## 2022-08-22 ENCOUNTER — LAB REQUISITION (OUTPATIENT)
Dept: LAB | Facility: CLINIC | Age: 62
End: 2022-08-22

## 2022-08-22 DIAGNOSIS — E11.9 TYPE 2 DIABETES MELLITUS WITHOUT COMPLICATIONS (H): ICD-10-CM

## 2022-08-22 LAB
MAGNESIUM SERPL-MCNC: 2.2 MG/DL (ref 1.7–2.3)
VIT B12 SERPL-MCNC: 767 PG/ML (ref 232–1245)

## 2022-08-22 PROCEDURE — 82607 VITAMIN B-12: CPT | Performed by: NURSE PRACTITIONER

## 2022-08-22 PROCEDURE — 83735 ASSAY OF MAGNESIUM: CPT | Performed by: NURSE PRACTITIONER

## 2022-10-02 ENCOUNTER — HEALTH MAINTENANCE LETTER (OUTPATIENT)
Age: 62
End: 2022-10-02

## 2023-02-11 ENCOUNTER — HEALTH MAINTENANCE LETTER (OUTPATIENT)
Age: 63
End: 2023-02-11

## 2023-03-17 ENCOUNTER — LAB REQUISITION (OUTPATIENT)
Dept: LAB | Facility: CLINIC | Age: 63
End: 2023-03-17

## 2023-03-17 DIAGNOSIS — E11.9 TYPE 2 DIABETES MELLITUS WITHOUT COMPLICATIONS (H): ICD-10-CM

## 2023-03-17 LAB
ANION GAP SERPL CALCULATED.3IONS-SCNC: 12 MMOL/L (ref 7–15)
BUN SERPL-MCNC: 9.4 MG/DL (ref 8–23)
CALCIUM SERPL-MCNC: 10 MG/DL (ref 8.8–10.2)
CHLORIDE SERPL-SCNC: 103 MMOL/L (ref 98–107)
CHOLEST SERPL-MCNC: 186 MG/DL
CREAT SERPL-MCNC: 0.69 MG/DL (ref 0.51–0.95)
DEPRECATED HCO3 PLAS-SCNC: 26 MMOL/L (ref 22–29)
GFR SERPL CREATININE-BSD FRML MDRD: >90 ML/MIN/1.73M2
GLUCOSE SERPL-MCNC: 105 MG/DL (ref 70–99)
HDLC SERPL-MCNC: 74 MG/DL
LDLC SERPL CALC-MCNC: 74 MG/DL
MAGNESIUM SERPL-MCNC: 2.1 MG/DL (ref 1.7–2.3)
NONHDLC SERPL-MCNC: 112 MG/DL
POTASSIUM SERPL-SCNC: 4.6 MMOL/L (ref 3.4–5.3)
SODIUM SERPL-SCNC: 141 MMOL/L (ref 136–145)
TRIGL SERPL-MCNC: 190 MG/DL

## 2023-03-17 PROCEDURE — 80048 BASIC METABOLIC PNL TOTAL CA: CPT | Performed by: FAMILY MEDICINE

## 2023-03-17 PROCEDURE — 83735 ASSAY OF MAGNESIUM: CPT | Performed by: FAMILY MEDICINE

## 2023-03-17 PROCEDURE — 80061 LIPID PANEL: CPT | Performed by: FAMILY MEDICINE

## 2023-08-01 ENCOUNTER — HOSPITAL ENCOUNTER (OUTPATIENT)
Dept: MRI IMAGING | Facility: HOSPITAL | Age: 63
Discharge: HOME | End: 2023-08-01
Attending: FAMILY MEDICINE | Admitting: FAMILY MEDICINE
Payer: COMMERCIAL

## 2023-08-01 DIAGNOSIS — R42 DIZZINESS: ICD-10-CM

## 2023-08-01 PROCEDURE — 70551 MRI BRAIN STEM W/O DYE: CPT

## 2023-08-12 ENCOUNTER — HEALTH MAINTENANCE LETTER (OUTPATIENT)
Age: 63
End: 2023-08-12

## 2023-09-05 ENCOUNTER — LAB REQUISITION (OUTPATIENT)
Dept: LAB | Facility: CLINIC | Age: 63
End: 2023-09-05

## 2023-09-05 DIAGNOSIS — R53.83 OTHER FATIGUE: ICD-10-CM

## 2023-09-05 PROCEDURE — 80053 COMPREHEN METABOLIC PANEL: CPT | Performed by: FAMILY MEDICINE

## 2023-09-05 PROCEDURE — 84443 ASSAY THYROID STIM HORMONE: CPT | Performed by: FAMILY MEDICINE

## 2023-09-06 LAB
ALBUMIN SERPL BCG-MCNC: 4.6 G/DL (ref 3.5–5.2)
ALP SERPL-CCNC: 107 U/L (ref 35–104)
ALT SERPL W P-5'-P-CCNC: 30 U/L (ref 0–50)
ANION GAP SERPL CALCULATED.3IONS-SCNC: 14 MMOL/L (ref 7–15)
AST SERPL W P-5'-P-CCNC: 25 U/L (ref 0–45)
BILIRUB SERPL-MCNC: 0.3 MG/DL
BUN SERPL-MCNC: 10 MG/DL (ref 8–23)
CALCIUM SERPL-MCNC: 10 MG/DL (ref 8.8–10.2)
CHLORIDE SERPL-SCNC: 103 MMOL/L (ref 98–107)
CREAT SERPL-MCNC: 0.66 MG/DL (ref 0.51–0.95)
DEPRECATED HCO3 PLAS-SCNC: 25 MMOL/L (ref 22–29)
GFR SERPL CREATININE-BSD FRML MDRD: >90 ML/MIN/1.73M2
GLUCOSE SERPL-MCNC: 111 MG/DL (ref 70–99)
POTASSIUM SERPL-SCNC: 4.5 MMOL/L (ref 3.4–5.3)
PROT SERPL-MCNC: 7.2 G/DL (ref 6.4–8.3)
SODIUM SERPL-SCNC: 142 MMOL/L (ref 136–145)
TSH SERPL DL<=0.005 MIU/L-ACNC: 2.1 UIU/ML (ref 0.3–4.2)

## 2024-01-01 ASSESSMENT — ASTHMA QUESTIONNAIRES
ACT_TOTALSCORE: 24
QUESTION_3 LAST FOUR WEEKS HOW OFTEN DID YOUR ASTHMA SYMPTOMS (WHEEZING, COUGHING, SHORTNESS OF BREATH, CHEST TIGHTNESS OR PAIN) WAKE YOU UP AT NIGHT OR EARLIER THAN USUAL IN THE MORNING: NOT AT ALL
QUESTION_2 LAST FOUR WEEKS HOW OFTEN HAVE YOU HAD SHORTNESS OF BREATH: ONCE OR TWICE A WEEK
QUESTION_5 LAST FOUR WEEKS HOW WOULD YOU RATE YOUR ASTHMA CONTROL: COMPLETELY CONTROLLED
QUESTION_4 LAST FOUR WEEKS HOW OFTEN HAVE YOU USED YOUR RESCUE INHALER OR NEBULIZER MEDICATION (SUCH AS ALBUTEROL): NOT AT ALL
QUESTION_1 LAST FOUR WEEKS HOW MUCH OF THE TIME DID YOUR ASTHMA KEEP YOU FROM GETTING AS MUCH DONE AT WORK, SCHOOL OR AT HOME: NONE OF THE TIME
ACT_TOTALSCORE: 24

## 2024-01-02 ENCOUNTER — OFFICE VISIT (OUTPATIENT)
Dept: INTERNAL MEDICINE | Facility: CLINIC | Age: 64
End: 2024-01-02
Payer: COMMERCIAL

## 2024-01-02 VITALS
OXYGEN SATURATION: 96 % | HEIGHT: 67 IN | DIASTOLIC BLOOD PRESSURE: 64 MMHG | WEIGHT: 216.3 LBS | RESPIRATION RATE: 17 BRPM | BODY MASS INDEX: 33.95 KG/M2 | SYSTOLIC BLOOD PRESSURE: 150 MMHG | HEART RATE: 66 BPM

## 2024-01-02 DIAGNOSIS — F25.9 SCHIZOAFFECTIVE DISORDER, UNSPECIFIED TYPE (H): ICD-10-CM

## 2024-01-02 DIAGNOSIS — J31.0 NON-ALLERGIC RHINITIS: ICD-10-CM

## 2024-01-02 DIAGNOSIS — E53.8 VITAMIN B12 DEFICIENCY (NON ANEMIC): ICD-10-CM

## 2024-01-02 DIAGNOSIS — R42 DIZZINESS: ICD-10-CM

## 2024-01-02 DIAGNOSIS — F41.9 ANXIETY: ICD-10-CM

## 2024-01-02 DIAGNOSIS — I10 BENIGN ESSENTIAL HYPERTENSION: ICD-10-CM

## 2024-01-02 DIAGNOSIS — E78.2 MIXED HYPERLIPIDEMIA: ICD-10-CM

## 2024-01-02 DIAGNOSIS — G47.33 OSA (OBSTRUCTIVE SLEEP APNEA): ICD-10-CM

## 2024-01-02 DIAGNOSIS — E11.9 TYPE 2 DIABETES MELLITUS WITHOUT COMPLICATION, WITHOUT LONG-TERM CURRENT USE OF INSULIN (H): Primary | ICD-10-CM

## 2024-01-02 DIAGNOSIS — F33.41 RECURRENT MAJOR DEPRESSIVE DISORDER, IN PARTIAL REMISSION (H): ICD-10-CM

## 2024-01-02 PROBLEM — N39.0 URINARY TRACT INFECTION: Status: RESOLVED | Noted: 2019-03-01 | Resolved: 2024-01-02

## 2024-01-02 PROCEDURE — 99204 OFFICE O/P NEW MOD 45 MIN: CPT | Performed by: INTERNAL MEDICINE

## 2024-01-02 RX ORDER — ASCORBIC ACID 500 MG
500 TABLET ORAL DAILY
COMMUNITY

## 2024-01-02 RX ORDER — AZELASTINE HYDROCHLORIDE 137 UG/1
SPRAY, METERED NASAL
COMMUNITY
Start: 2023-03-06

## 2024-01-02 RX ORDER — BLOOD SUGAR DIAGNOSTIC
STRIP MISCELLANEOUS
COMMUNITY
Start: 2023-11-30

## 2024-01-02 RX ORDER — AMLODIPINE BESYLATE 5 MG/1
5 TABLET ORAL DAILY
Qty: 90 TABLET | Refills: 1 | Status: SHIPPED | OUTPATIENT
Start: 2024-01-02 | End: 2024-03-19

## 2024-01-02 RX ORDER — LANCETS 33 GAUGE
EACH MISCELLANEOUS
COMMUNITY
Start: 2023-03-16

## 2024-01-02 RX ORDER — BLOOD-GLUCOSE METER
EACH MISCELLANEOUS SEE ADMIN INSTRUCTIONS
COMMUNITY
Start: 2023-06-09

## 2024-01-02 RX ORDER — LOSARTAN POTASSIUM 50 MG/1
50 TABLET ORAL DAILY
COMMUNITY
Start: 2022-04-28 | End: 2024-07-08

## 2024-01-02 ASSESSMENT — PAIN SCALES - GENERAL: PAINLEVEL: NO PAIN (0)

## 2024-01-02 ASSESSMENT — ENCOUNTER SYMPTOMS: FATIGUE: 1

## 2024-01-02 NOTE — PROGRESS NOTES
Assessment & Plan   Problem List Items Addressed This Visit          Respiratory    Non-allergic rhinitis     Continues to struggle with constant rhinorrhea.  Has previously has tried Singulair and many other nasal sprays that have not been effective.  -Will get WILL for records of previously tried therapies  -Will continue Astelin nasal spray         Relevant Medications    Azelastine HCl 137 MCG/SPRAY SOLN    VONDA (obstructive sleep apnea)     Patient tells me this was initially diagnosed 6 years ago in the setting of new onset fatigue.  She was a started on a CPAP initially did help her to feel nervous in the morning but that effect tapered off, so she stopped using it.  She is scheduled for repeat sleep study through [facility] later this month.  -I agree that untreated sleep apnea certainly could be contributing to her current fatigue and would have her repeat sleep study            Digestive    Vitamin B12 deficiency (non anemic)     Patient tells me she was told to have low B12 in the past.  Is now taking oral vitamin B12 1000 mcg daily.  Last level 8/2022 was 757.  -Okay to continue current vitamin B12 supplement  -WILL filled out for prior PCP            Endocrine    Hyperlipidemia     Lipids 3/17/23 Tchol 186, , HDL 74, LDL 74  -Well-controlled and will continue pravastatin 40 mg daily         Diabetes mellitus, type 2 (H) - Primary     Well-controlled on current regimen of metformin 1g in AM and 500 mg in PM.  Per patient report last A1c 3 months ago was 5.7.  -Would continue same dose of metformin  -Will get outside records to see prior A1c's, microalbumin, foot exams and when last eye exam was         Relevant Medications    ONETOUCH VERIO IQ test strip       Circulatory    Benign essential hypertension     Above goal in clinic today and she tells me has been elevated over the past few months.  -Recommend starting amlodipine 5 mg daily in addition to losartan 50 mg daily         Relevant Medications     "losartan (COZAAR) 50 MG tablet    amLODIPine (NORVASC) 5 MG tablet       Behavioral    Depression     Also follows with psychiatry for this.  Current regimen Zoloft 200 mg daily.         Schizoaffective disorder (H)     Follows with CHER Alarcon at [facility] in [place]   -Continue perphenazine 8 mg at bedtime, will leave management to her psychiatric provider            Other    Anxiety     Also follows with psychiatry for this.  In addition to Zoloft 200 mg daily, regimen includes gabapentin 600 mg at bedtime and trazodone 100 mg at bedtime.  Was also previously on buspirone but this was stopped a few months ago because of weight gain.  -Defer management to her psychiatrist, okay to continue current doses of Zoloft, gabapentin and trazodone         Dizziness     Patient comes in today mostly to discuss 5 years of dizziness and fatigue.  Symptoms do not necessarily point to any 1 specific etiology.  Slightly improved with vestibular PT earlier this year but still will notice onset of fatigue and dizziness after she exerts herself.  Discussed with the patient that prior to starting any new workup I really need to see records from her prior PCP to better understand what workup has been done in the past and what treatments have been tried.   - WILL filled out for past PCP  - Agree with repeating sleep study   - Consider further work-up pending review of outside records  - Description of fatigue that worsens after exercise/exertion somewhat suspicious for chronic fatigue syndrome  -Also agree with patient that she does need to work on increasing her exercise tolerance and overall cardiovascular health             Prescription drug management     BMI:   Estimated body mass index is 34.39 kg/m  as calculated from the following:    Height as of this encounter: 1.689 m (5' 6.5\").    Weight as of this encounter: 98.1 kg (216 lb 4.8 oz).   Weight management plan: Discussed healthy diet and exercise " "guidelines    FUTURE APPOINTMENTS:       - Follow-up visit in 2-3 months    Louise Rivera MD  [facility] JAMES Nunez is a 63 year old, presenting for the following health issues:  Establish Care, Vertigo, and Fatigue    History of Present Illness     Reason for visit:  Fatigue and Vertigo dariela had for 5 years. Also getting sick with cold symptoms every single fall/winter.    Mayra is here to establish care today. Previously followed at [facility] with Dr. Ezequiel Ocampo at [facility] in [place]. Records not available for review today. Tells me that she feels that she isn't taking issues seriously right now. Wanting second opinion to \"figure this out\". We discussed her current issues as below.    Dizziness / vertigo  Ongoing for 5 years. Can't ride bike in summer because she gets too tired. Can't walk (although this is more of an issue with foot right now). Can't do things every day. If she cleans apartment one day that will put her out for 3 days. Will get significant fatigue, hot flashes and dizzy in days following. Does experience some air hunger, feels this more related to anxiety, not shortness of breath or with lungs.      Dizziness is worst after she is in dental chair. Unsteady, can't walk straight. Room is spinning. Can't get head straight. Can't lay on right side because room spins and she feels like she is going to faint. Did do vestibular PT spring 2023 for Epley maneuvers, helped a little bit but then came back. Only did 3 sessions.     Work-up thus far: Normal brain MRI 8/1/23. Saw ENT last fall 2023 ([facility]), they said everything was normal. Saw neurologist (she can't remember who) ~5 years ago, had MRI back then that was also unremarkable, wanted to do some other testing but never followed through. She does not know what lab work has been done and does not think there has been any pulmonary or cardiac workup. In our system, can see TSH 2.10 and CMP WNL " (9/5/23).     VONDA  Initial sleep study was 6 years ago before above symptoms started. Was on CPAP, used for 6 months but tells me it wasn't working well because when she started using it did feel energized in the morning but that effect tapered off, so stopped using. She tells me she is scheduled on 1/10/24 for sleep study through [facility] that is done at home.     HTN  Current regimen is losartan 50 mg daily. BP today 150/64. Was 140s at dentist a few weeks ago. Previously also on hydrochlorothiazide 25 mg but stopped in case it was contributing to dizziness.     T2DM  Current regimen is Metformin 1g in AM and 500 mg PM. Last A1c 5.7 three months ago per her report. She tells me that she lost 60 lbs after starting metformin 4-5 years ago.  Weight has been fairly stable since then.  216 pounds today.    HLD  Current regimen is pravastatin 40 mg daily. Lipids 3/17/23 Tchol 186, , HDL 74, LDL 74    B12 deficiency?  Found to be low in the past, taking oral B12 1000 mcg daily. Last level 8/22/22 767.     Schizoaffective disorder:   Current regimen is Perphenazine 8 mg at bedtime on this since 1988, stable on this dose for many years. Cogentin stopped summer 2023 (no change in symptoms with stopping).  This is managed by CHER Alarcon at [facility] in [place]     MDD  zoloft 200 mg daily, stable on this dose for many years as well    Anxiety  gabapentin 600 mg at bedtime, trazodone 100 mg at bedtime. Buspirone stopped a few months ago b/c of weight gain.     Non-allergic rhinitis  Has continuous runny nose. Was on singulair but stopped. Tells me she has tried all of the nasal sprays. Using astelin nasal spray intermittently right now.     She eats 2-3 servings of fruits and vegetables daily.She consumes 0 sweetened beverage(s) daily.She exercises with enough effort to increase her heart rate 9 or less minutes per day.  She exercises with enough effort to increase her heart rate 3 or less days per week.  "  She is taking medications regularly.      Review of Systems   Constitutional:  Positive for fatigue.      Constitutional, HEENT, cardiovascular, pulmonary, gi and gu systems are negative, except as otherwise noted.      Objective    BP (!) 150/64 (BP Location: Right arm, Patient Position: Sitting, Cuff Size: Adult Large)   Pulse 66   Resp 17   Ht 1.689 m (5' 6.5\")   Wt 98.1 kg (216 lb 4.8 oz)   SpO2 96%   BMI 34.39 kg/m    Body mass index is 34.39 kg/m .  Physical Exam   GENERAL: healthy, alert and no distress  EYES: Eyes grossly normal to inspection and conjunctivae and sclerae normal  HENT: nose and mouth without ulcers or lesions  RESP: breathing comfortably and speaking in full sentences on room air with no respiratory distress or coughing  CV: warm and well perfused  ABDOMEN: soft, nontender  SKIN: no suspicious lesions or rashes on exposed skin  NEURO: No focal deficits, mentation intact and speech normal  PSYCH: mentation appears normal, affect normal/bright                "

## 2024-01-02 NOTE — PATIENT INSTRUCTIONS
Need to get old records to look more into symptoms and what work up has been done.     Start amlodipine 5 mg daily for blood pressure.

## 2024-01-04 PROBLEM — J31.0 NON-ALLERGIC RHINITIS: Status: ACTIVE | Noted: 2024-01-04

## 2024-01-04 PROBLEM — G47.33 OSA (OBSTRUCTIVE SLEEP APNEA): Status: ACTIVE | Noted: 2024-01-04

## 2024-01-04 PROBLEM — E53.8 VITAMIN B12 DEFICIENCY (NON ANEMIC): Status: ACTIVE | Noted: 2024-01-04

## 2024-01-04 PROBLEM — R42 DIZZINESS: Status: ACTIVE | Noted: 2024-01-04

## 2024-01-04 PROBLEM — E87.6 HYPOKALEMIA: Status: RESOLVED | Noted: 2019-03-01 | Resolved: 2024-01-04

## 2024-01-04 NOTE — ASSESSMENT & PLAN NOTE
Well-controlled on current regimen of metformin 1g in AM and 500 mg in PM.  Per patient report last A1c 3 months ago was 5.7.  -Would continue same dose of metformin  -Will get outside records to see prior A1c's, microalbumin, foot exams and when last eye exam was

## 2024-01-04 NOTE — ASSESSMENT & PLAN NOTE
Continues to struggle with constant rhinorrhea.  Has previously has tried Singulair and many other nasal sprays that have not been effective.  -Will get WILL for records of previously tried therapies  -Will continue Astelin nasal spray

## 2024-01-04 NOTE — ASSESSMENT & PLAN NOTE
Also follows with psychiatry for this.  In addition to Zoloft 200 mg daily, regimen includes gabapentin 600 mg at bedtime and trazodone 100 mg at bedtime.  Was also previously on buspirone but this was stopped a few months ago because of weight gain.  -Defer management to her psychiatrist, okay to continue current doses of Zoloft, gabapentin and trazodone

## 2024-01-04 NOTE — ASSESSMENT & PLAN NOTE
Patient tells me this was initially diagnosed 6 years ago in the setting of new onset fatigue.  She was a started on a CPAP initially did help her to feel nervous in the morning but that effect tapered off, so she stopped using it.  She is scheduled for repeat sleep study through [facility] later this month.  -I agree that untreated sleep apnea certainly could be contributing to her current fatigue and would have her repeat sleep study

## 2024-01-04 NOTE — ASSESSMENT & PLAN NOTE
Follows with CHER Alarcon at [facility] in [place]   -Continue perphenazine 8 mg at bedtime, will leave management to her psychiatric provider

## 2024-01-04 NOTE — ASSESSMENT & PLAN NOTE
Patient comes in today mostly to discuss 5 years of dizziness and fatigue.  Symptoms do not necessarily point to any 1 specific etiology.  Slightly improved with vestibular PT earlier this year but still will notice onset of fatigue and dizziness after she exerts herself.  Discussed with the patient that prior to starting any new workup I really need to see records from her prior PCP to better understand what workup has been done in the past and what treatments have been tried.   - WILL filled out for past PCP  - Agree with repeating sleep study   - Consider further work-up pending review of outside records  - Description of fatigue that worsens after exercise/exertion somewhat suspicious for chronic fatigue syndrome  -Also agree with patient that she does need to work on increasing her exercise tolerance and overall cardiovascular health

## 2024-01-04 NOTE — ASSESSMENT & PLAN NOTE
Lipids 3/17/23 Tchol 186, , HDL 74, LDL 74  -Well-controlled and will continue pravastatin 40 mg daily

## 2024-01-04 NOTE — COMMUNITY RESOURCES LIST (ENGLISH)
01/04/2024   [facility]  N/A  For questions about this resource list or additional care needs, please contact your primary care clinic or care manager.  Phone: [phone]   Email: N/A   Address: [address] [place] [id number]   Hours: N/A        Food and Nutrition       Food pantry  1  [facility] - Food Shelf Distance: 0.94 miles      Pickup   [address] [place] [address]  Language: English, Kittitian  Hours: Mon - Tue 2:00 PM - 7:00 PM , Wed 11:00 AM - 2:00 PM , Fri 11:00 AM - 2:00 PM  Fees: Free   Phone: (356) 620-5730 Email: info@[employer] Website: http://[employer].org     2  [facility] [place] - Food Shelf Distance: 2.12 miles      Pickup   [address] [place] [address]  Language: English  Hours: Mon - Wed 9:30 AM - 2:30 PM  Fees: Free   Phone: (919) 905-5087 Email: austin@[facility].org Website: http://[facility].org/[facility]/[employer]/     SNAP application assistance  3  [facility] Distance: 5.69 miles      Phone/Virtual   [address] [facility] [address] [place] [address]  Language: English, Hmong, Swiss, Kittitian, Azeri  Hours: Mon - Thu 8:00 AM - 5:30 PM , Fri 8:00 AM - 12:00 PM  Fees: Free   Phone: (295) 236-3672 Email: info@[employer].org Website: http://www.[employer].org     4  [facility] [employer] [employer] Distance: 7.54 miles      In-Person   [address] [place] [address]  Language: English, Kittitian  Hours: Mon - Fri 8:30 AM - 5:00 PM  Fees: Free   Phone: (248) 961-9401 Email: info@clues.org Website: http://www.clues.org     Soup kitchen or free meals  5  [facility] - Thursday Night Community Meal Distance: 3.15 miles      In-Person   [address] [place] [address]  Language: English, Kittitian  Hours: Thu 6:00 PM - 7:00 PM  Fees: Free   Phone: (190)  419-9595 Email: center@[employer] Website: https://www.[employer]/community-resource-center/     6  [facility] - Loaves and Fishes Distance: 5.18 miles      Pickup   [address] [place] [address]  Language: English  Hours: Wed 5:30 PM - 6:30 PM  Fees: Free   Phone: (257) 517-8324 Email: office@[employer] Website: https://www.[facility].[employer]          Important Numbers & Websites       Emergency Services   911  [facility] Services   311  Poison Control   (322) 195-6734  Suicide Prevention Lifeline   (870) 693-8651 (TALK)  Child Abuse Hotline   (944) 862-7348 (4-A-Child)  Sexual Assault Hotline   (980) 214-6825 (HOPE)  National Runaway Safeline   (362) 389-1056 (RUNAWAY)  All-Options Talkline   (260) 411-6724  Substance Abuse Referral   (346) 243-7434 (HELP)

## 2024-01-04 NOTE — ASSESSMENT & PLAN NOTE
Patient tells me she was told to have low B12 in the past.  Is now taking oral vitamin B12 1000 mcg daily.  Last level 8/2022 was 757.  -Okay to continue current vitamin B12 supplement  -WILL filled out for prior PCP

## 2024-01-04 NOTE — ASSESSMENT & PLAN NOTE
Above goal in clinic today and she tells me has been elevated over the past few months.  -Recommend starting amlodipine 5 mg daily in addition to losartan 50 mg daily

## 2024-02-20 ENCOUNTER — NURSE TRIAGE (OUTPATIENT)
Dept: INTERNAL MEDICINE | Facility: CLINIC | Age: 64
End: 2024-02-20

## 2024-02-20 NOTE — TELEPHONE ENCOUNTER
"Patient calling.    Symptoms for almost a month and not improving. She gets this every year but it is worse this time. She is \"very, very tired\" (can do some chores), weakness (no balance issues, can get around home), constant runny nose at baseline, headache, gets ear pain often, has positional vertigo which is worse when she is tired. Needs to lay down and rest multiple times a day.     Taking all medications. Resting helps. Sleeping 12 hours at night (midnight-noon), can't sleep during the day. No new medications. Blood glucose has been fine (108, 115, 112, 98 throughout day) she cannot take morning fasting blood glucose because she is too tired.     She has not been tested for covid, flu, etc. Would like PCP's advice.    Ok to leave detailed message.       Reason for Disposition   MODERATE weakness (i.e., interferes with work, school, normal activities) and cause unknown (Exceptions: Weakness from acute minor illness or from poor fluid intake; weakness is chronic and not worse.)    Additional Information   Negative: SEVERE difficulty breathing (e.g., struggling for each breath, speaks in single words)   Negative: Shock suspected (e.g., cold/pale/clammy skin, too weak to stand, low BP, rapid pulse)   Negative: Difficult to awaken or acting confused (e.g., disoriented, slurred speech)   Negative: Fainted > 15 minutes ago and still feels too weak or dizzy to stand   Negative: SEVERE weakness (i.e., unable to walk or barely able to walk, requires support) and new-onset or worsening   Negative: Sounds like a life-threatening emergency to the triager   Negative: Weakness of the face, arm or leg on one side of the body   Negative: Has diabetes mellitus and weakness from low blood sugar (i.e., < 60 mg/dL or 3.5 mmol/L)   Negative: Recent heat exposure, suspected cause of weakness   Negative: Vomiting is main symptom   Negative: Diarrhea is main symptom   Negative: Difficulty breathing   Negative: Heart beating < 50 " "beats per minute OR > 140 beats per minute   Negative: Extra heartbeats, irregular heart beating, or heart is beating very fast (i.e., 'palpitations')   Negative: Follows large amount of bleeding (e.g., from vomiting, rectum, vagina) (Exception: Small transient weakness from sight of a small amount blood.)   Negative: Black or tarry bowel movements   Negative: MODERATE weakness or fatigue from poor fluid intake with no improvement after 2 hours of rest and fluids   Negative: Drinking very little and dehydration suspected (e.g., no urine > 12 hours, very dry mouth, very lightheaded)   Negative: Patient sounds very sick or weak to the triager    Answer Assessment - Initial Assessment Questions  1. DESCRIPTION: \"Describe how you are feeling.\"      \"Very, very tired and weak\"  2. SEVERITY: \"How bad is it?\"  \"Can you stand and walk?\"    - MODERATE (4-7): Able to stand and walk; weakness interferes with work, school, or normal activities.      3. ONSET: \"When did these symptoms begin?\" (e.g., hours, days, weeks, months)      Almost a month ago  4. CAUSE: \"What do you think is causing the weakness or fatigue?\" (e.g., not drinking enough fluids, medical problem, trouble sleeping)      unsure  5. NEW MEDICINES:  \"Have you started on any new medicines recently?\" (e.g., opioid pain medicines, benzodiazepines, muscle relaxants, antidepressants, antihistamines, neuroleptics, beta blockers)      No  6. OTHER SYMPTOMS: \"Do you have any other symptoms?\" (e.g., chest pain, fever, cough, SOB, vomiting, diarrhea, bleeding, other areas of pain)      Occasional cough, constant runny nose at baseline, no balance issues, normal BM and eating, staying hydrated  7. PREGNANCY: \"Is there any chance you are pregnant?\" \"When was your last menstrual period?\"      NA    Protocols used: Weakness (Generalized) and Fatigue-A-OH    "

## 2024-02-20 NOTE — TELEPHONE ENCOUNTER
This sounds like continued chronic fatigue that we talked about at her visit in January (has been ongoing for 5 years). I have not yet received records from [facility] (last note we have is from 2019). Can we please look in to getting these sent over?    If she really feels symptoms are worsening, we can move up her appointment from March with approval required slot.

## 2024-02-20 NOTE — TELEPHONE ENCOUNTER
Called [facility] to request records- they rcvd WILL & will send over last year of notes/labs while we await the full 5 years of records to be processed by their offsite medical records.    Talked to patient, would like to hold off on moving up appt for now.  We see how she feels by Friday but consistent with chronic fatigue     Records rcvd from [facility]- placed on PCP desk for review.

## 2024-03-09 ENCOUNTER — HEALTH MAINTENANCE LETTER (OUTPATIENT)
Age: 64
End: 2024-03-09

## 2024-03-18 ASSESSMENT — PATIENT HEALTH QUESTIONNAIRE - PHQ9
SUM OF ALL RESPONSES TO PHQ QUESTIONS 1-9: 8
10. IF YOU CHECKED OFF ANY PROBLEMS, HOW DIFFICULT HAVE THESE PROBLEMS MADE IT FOR YOU TO DO YOUR WORK, TAKE CARE OF THINGS AT HOME, OR GET ALONG WITH OTHER PEOPLE: VERY DIFFICULT
SUM OF ALL RESPONSES TO PHQ QUESTIONS 1-9: 8

## 2024-03-19 ENCOUNTER — OFFICE VISIT (OUTPATIENT)
Dept: INTERNAL MEDICINE | Facility: CLINIC | Age: 64
End: 2024-03-19
Payer: COMMERCIAL

## 2024-03-19 VITALS
BODY MASS INDEX: 33.79 KG/M2 | HEIGHT: 67 IN | RESPIRATION RATE: 16 BRPM | HEART RATE: 68 BPM | OXYGEN SATURATION: 95 % | WEIGHT: 215.3 LBS | TEMPERATURE: 98.5 F | SYSTOLIC BLOOD PRESSURE: 124 MMHG | DIASTOLIC BLOOD PRESSURE: 62 MMHG

## 2024-03-19 DIAGNOSIS — I10 BENIGN ESSENTIAL HYPERTENSION: ICD-10-CM

## 2024-03-19 DIAGNOSIS — F33.41 RECURRENT MAJOR DEPRESSIVE DISORDER, IN PARTIAL REMISSION (H): ICD-10-CM

## 2024-03-19 DIAGNOSIS — R53.82 CHRONIC FATIGUE: ICD-10-CM

## 2024-03-19 DIAGNOSIS — F25.9 SCHIZOAFFECTIVE DISORDER, UNSPECIFIED TYPE (H): ICD-10-CM

## 2024-03-19 DIAGNOSIS — R42 DIZZINESS: ICD-10-CM

## 2024-03-19 DIAGNOSIS — E78.2 MIXED HYPERLIPIDEMIA: ICD-10-CM

## 2024-03-19 DIAGNOSIS — R42 VERTIGO: Primary | ICD-10-CM

## 2024-03-19 DIAGNOSIS — E11.9 TYPE 2 DIABETES MELLITUS WITHOUT COMPLICATION, WITHOUT LONG-TERM CURRENT USE OF INSULIN (H): ICD-10-CM

## 2024-03-19 DIAGNOSIS — F41.9 ANXIETY: ICD-10-CM

## 2024-03-19 DIAGNOSIS — G47.33 OSA (OBSTRUCTIVE SLEEP APNEA): ICD-10-CM

## 2024-03-19 PROCEDURE — 82570 ASSAY OF URINE CREATININE: CPT | Performed by: INTERNAL MEDICINE

## 2024-03-19 PROCEDURE — 99215 OFFICE O/P EST HI 40 MIN: CPT | Performed by: INTERNAL MEDICINE

## 2024-03-19 PROCEDURE — 82043 UR ALBUMIN QUANTITATIVE: CPT | Performed by: INTERNAL MEDICINE

## 2024-03-19 RX ORDER — MECLIZINE HYDROCHLORIDE 25 MG/1
25 TABLET ORAL 3 TIMES DAILY PRN
Qty: 30 TABLET | Refills: 1 | Status: SHIPPED | OUTPATIENT
Start: 2024-03-19

## 2024-03-19 NOTE — PROGRESS NOTES
Assessment & Plan   Problem List Items Addressed This Visit          Respiratory    VONDA (obstructive sleep apnea)     Initially diagnosed 6 years ago in the setting of her new onset fatigue.  Had used a CPAP but was not sure of benefit so stopped.  We again discussed the importance of getting a repeat sleep study as untreated sleep apnea could certainly be contributing to her chronic fatigue and daytime sleepiness.  - Sleep medicine appt is scheduled for May            Endocrine    Hyperlipidemia     Well-controlled on current regimen, last lipids 3/2023 showed LDL of 74.  - Continue pravastatin 40 mg daily  - Due for repeat lipids with next set of labs         Diabetes mellitus, type 2 (H)     Well-controlled on current regimen of metformin 1g in AM and 500 mg in PM.  A1c 5.9 (1/2024)  -Would continue same dose of metformin         Relevant Orders    Albumin Random Urine Quantitative with Creat Ratio (Completed)       Circulatory    Benign essential hypertension     Well-controlled on current regimen today.  - Continue losartan 50 mg daily            Behavioral    Depression     Also follows with psychiatry for this.  Current regimen Zoloft 200 mg daily.         Schizoaffective disorder (H)     Follows with CHER Alarcon at [facility] in [place]   -Continue perphenazine 8 mg at bedtime, will leave management to her psychiatric provider            Other    Anxiety     Also follows with psychiatry for this.  In addition to Zoloft 200 mg daily, regimen includes gabapentin 600 mg at bedtime and trazodone 100 mg at bedtime.  Was also previously on buspirone but this was stopped a few months ago because of weight gain.  -Defer management to her psychiatrist, okay to continue current doses of Zoloft, gabapentin and trazodone         Vertigo - Primary     Patient continues to have issues with dizziness and vertigo.  She describes this as room spinning and is worse when she lays on her right side.  This  symptom in particular does seem consistent with BPPV and I do think would respond to vestibular PT.  - Referral placed to vestibular PT again today  - Okay to trial PRN meclizine as well          Relevant Medications    meclizine (ANTIVERT) 25 MG tablet    Other Relevant Orders    Physical Therapy  Referral    Chronic fatigue     Patient has been dealing with chronic fatigue along with other nonspecific symptoms over the last 5 to 6 years.  We reviewed workup with her prior PCP and her history with this again today.  Thus far, workup with labs and brain MRI has been unrevealing.  Still does need sleep eval to rule out untreated VONDA which could be contributing.  That being said, her fatigue and other symptoms that are exacerbated if she exerts herself too much are consistent with a spectrum of chronic fatigue syndrome.  I did discuss this with the patient as well today.  Try to manage expectations saying that we can do our best to help her regain some functionality again but this will take time and effort.  - For now, we are going to have her start with vestibular PT to hopefully help with the aspects of vertigo and dizziness  - After this, can consider PT more focused on chronic fatigue and building exercise tolerance   - Continue regular follow up every 2-4 months              Review of prior external note(s) from - Outside records from [facility] and her Psychiatrist  Prescription drug management  I spent a total of 41 minutes on the day of the visit.   Time spent by me doing chart review, history and exam, documentation and further activities per the note     FUTURE APPOINTMENTS:       - Follow-up visit in 2-3 months       Vernell Nunez is a 63 year old, presenting for the following health issues:  Follow Up (2M -1/2/24 ) and Forms (Fill out some forms)        3/19/2024     3:08 PM   Additional Questions   Roomed by Jessica HAHN     History of Present Illness     Reason for visit:  Possible Chronic Fatigue  "Syndrome due to debilitating fatigue, headaches, ears ringing as well as other symptoms. Need Doctor also to fill out forms and i need to ask for medication for my persistent Vertigo.    Mayra is here for follow-up today.  Since her last visit I was able to get records from her prior PCP and psychiatrist and reviewed them. Most recent labs:   - 1/12/24: A1c 5.9  - 9/5/23: TSH 2.10, CMP and CBC WNL  - 3/17/23: Lipids showed LDL 74     Dizziness and fatigue: Chronic.  Did receive prior PCP's records and was able to review as noted above.  Lab workup unremarkable.  Brain MRI 8/2023 without any concerning findings.  Had improved slightly with vestibular PT but then symptoms returned so she stopped going.  Prior PCP had referred to [facility] in [place], never made appt there. These symptoms are ongoing and causing significant disability. She brings in a note today with list of symptoms she has been experiencing over the last 2 years  - Fatigue \"ALL DAY LONG after 9-10 hours of sleep\"  - Ringing in ears  - Joint/muscle pain  - Lower back pain  - Headaches  - Sometimes a sore throat  - Vertigo  - Some sleep disturbance - takes me a long time to fall asleep   - Ear pain  - Constant runny nose  - Heat intolerance  - Air hunger  - Exhaustion after exercise - takes me days to recover   - Forgetfulness  - Balance issues when getting up in the morning, then having to lay back down for hours  - IBS flare ups     Very frustrated because she feels prior PCP never was able to help her since symptoms continued. Has to rest in bed most of the day if she has to leave the next day. Tells me she spends anywhere from 12-18 hours laying down most days with fatigue. Experiences significant rebound fatigue if she exerts herself too much.     Wonders about script for meclizine. Vertigo increases if she lays down on R side.     VONDA: At our last visit, she told me there was a plan was for sleep study 1/10/24, " "however, she cancelled b/c of fatigue. Has sleep medicine appointment scheduled for 5/8/24.     HTN: current prescribed regimen is losartan 50 mg daily and amlodipine 5 mg daily. I had prescribed amlodipine last visit. However, she tells me that she then saw prior PCP again and that provider told her not to take amlodipine, so she stopped. Only taking losartan 50 mg daily right now. BP is 124/62 today.     T2DM: current regimen is Metformin 1g AM and 500 mg PM. A1c was 5.9 (1/12/2024).     HLD: pravastatin 40 mg daily. LDL 74 (3/2023)    Schizoaffective disorder: Current regimen is Perphenazine 8 mg at bedtime on this since 1988, stable on this dose for many years. This is managed by CHER Alarcon at [facility] in [place].      MDD: zoloft 200 mg daily, also managed by psychiatry     Anxiety: gabapentin 600 mg at bedtime and trazodone 100 mg at bedtime. Also managed by psychiatry.     AR: astelin nasal spray     She eats 2-3 servings of fruits and vegetables daily.She consumes 0 sweetened beverage(s) daily.She exercises with enough effort to increase her heart rate 10 to 19 minutes per day.  She exercises with enough effort to increase her heart rate 3 or less days per week.     She is taking medications regularly.       Review of Systems  Constitutional, neuro, ENT, endocrine, pulmonary, cardiac, gastrointestinal, genitourinary, musculoskeletal, integument and psychiatric systems are negative, except as otherwise noted.      Objective    /62   Pulse 68   Temp 98.5  F (36.9  C) (Oral)   Resp 16   Ht 1.689 m (5' 6.5\")   Wt 97.7 kg (215 lb 4.8 oz)   SpO2 95%   BMI 34.23 kg/m    Body mass index is 34.23 kg/m .  Physical Exam   GENERAL: healthy, alert and no distress  EYES: Eyes grossly normal to inspection and conjunctivae and sclerae normal  HENT: nose and mouth without ulcers or lesions  RESP: breathing comfortably and speaking in full sentences on room air with no respiratory distress or " coughing  CV: warm and well perfused  SKIN: no suspicious lesions or rashes on exposed skin  NEURO: No focal deficits, mentation intact and speech normal  PSYCH: mentation appears normal, affect normal, tearful throughout visit       Recent labs noted in HPI.         Signed Electronically by: Louise Rivera MD

## 2024-03-20 LAB
CREAT UR-MCNC: 145 MG/DL
MICROALBUMIN UR-MCNC: <12 MG/L
MICROALBUMIN/CREAT UR: NORMAL MG/G{CREAT}

## 2024-03-21 PROBLEM — R53.82 CHRONIC FATIGUE: Status: ACTIVE | Noted: 2024-03-21

## 2024-03-21 NOTE — ASSESSMENT & PLAN NOTE
Initially diagnosed 6 years ago in the setting of her new onset fatigue.  Had used a CPAP but was not sure of benefit so stopped.  We again discussed the importance of getting a repeat sleep study as untreated sleep apnea could certainly be contributing to her chronic fatigue and daytime sleepiness.  - Sleep medicine appt is scheduled for May

## 2024-03-21 NOTE — ASSESSMENT & PLAN NOTE
Follows with CHER Alarcon at [facility] in [place]   -Continue perphenazine 8 mg at bedtime, will leave management to her psychiatric provider

## 2024-03-21 NOTE — ASSESSMENT & PLAN NOTE
Patient continues to have issues with dizziness and vertigo.  She describes this as room spinning and is worse when she lays on her right side.  This symptom in particular does seem consistent with BPPV and I do think would respond to vestibular PT.  - Referral placed to vestibular PT again today  - Okay to trial PRN meclizine as well

## 2024-03-21 NOTE — ASSESSMENT & PLAN NOTE
Well-controlled on current regimen, last lipids 3/2023 showed LDL of 74.  - Continue pravastatin 40 mg daily  - Due for repeat lipids with next set of labs

## 2024-03-21 NOTE — ASSESSMENT & PLAN NOTE
Patient has been dealing with chronic fatigue along with other nonspecific symptoms over the last 5 to 6 years.  We reviewed workup with her prior PCP and her history with this again today.  Thus far, workup with labs and brain MRI has been unrevealing.  Still does need sleep eval to rule out untreated VONDA which could be contributing.  That being said, her fatigue and other symptoms that are exacerbated if she exerts herself too much are consistent with a spectrum of chronic fatigue syndrome.  I did discuss this with the patient as well today.  Try to manage expectations saying that we can do our best to help her regain some functionality again but this will take time and effort.  - For now, we are going to have her start with vestibular PT to hopefully help with the aspects of vertigo and dizziness  - After this, can consider PT more focused on chronic fatigue and building exercise tolerance   - Continue regular follow up every 2-4 months

## 2024-03-21 NOTE — ASSESSMENT & PLAN NOTE
Well-controlled on current regimen of metformin 1g in AM and 500 mg in PM.  A1c 5.9 (1/2024)  -Would continue same dose of metformin

## 2024-03-26 DIAGNOSIS — E53.8 VITAMIN B12 DEFICIENCY (NON ANEMIC): Primary | ICD-10-CM

## 2024-04-03 ENCOUNTER — TRANSFERRED RECORDS (OUTPATIENT)
Dept: HEALTH INFORMATION MANAGEMENT | Facility: CLINIC | Age: 64
End: 2024-04-03
Payer: COMMERCIAL

## 2024-05-07 ENCOUNTER — TELEPHONE (OUTPATIENT)
Dept: INTERNAL MEDICINE | Facility: CLINIC | Age: 64
End: 2024-05-07
Payer: COMMERCIAL

## 2024-05-07 NOTE — TELEPHONE ENCOUNTER
Patient Returning Call    Reason for call:  Had to leave dentists office because bp was high / panic attack - wondering about additional medication and what dosage should be     Information relayed to patient:  Message sent     Patient has additional questions:  Yes    What are your questions/concerns:  Had to leave dentists office because bp was high / panic attack  - wondering about additional medication and what dosage should be     Could we send this information to you in [facility] or would you prefer to receive a phone call?:   Patient would prefer a phone call   Okay to leave a detailed message?: Yes at Home number on file [phone] (home)

## 2024-05-07 NOTE — TELEPHONE ENCOUNTER
Would defer to her psychiatrist about anxiety/panic medication adjustments.     For blood pressure, I would like her to monitor blood pressure at home, keep a log over the next 2 weeks.  And elevated blood pressure while having a panic attack is likely not reason enough to adjust her medications, unless that she is having elevated blood pressures at home as well.  We have follow-up in June, which is sufficient, unless she notices blood pressures are persistently above 140/90 when checking at home.

## 2024-05-08 ENCOUNTER — PATIENT OUTREACH (OUTPATIENT)
Dept: GERIATRIC MEDICINE | Facility: CLINIC | Age: 64
End: 2024-05-08

## 2024-05-08 NOTE — LETTER
May 8, 2024    MAYRA PIERRE  [address]  [place] [address]      Dear Mayra:    As a member of [facility] ([facility]), you are provided a care coordinator. I will be your new care coordinator as of 5/1/24. I will be calling you soon to see how you are doing and determine your needs.    If you have any questions, please feel free to call me at [phone]. If you reach my voice mail, please leave a message and your phone number. If you are hearing impaired, please call the [place] Relay at 663 or [phone] (qbulvi-po-hftpur relay service).    I look forward to speaking with you soon.    Sincerely,    Mayra Dean RN  [phone]  [email or web address] is a health plan that contracts with both Medicare and the [place] Medical Assistance (Medicaid) program to provide benefits of both programs to enrollees. Enrollment in [facility] depends on contract renewal.      [facility]  [id number] DHS Approved ([id number])  [id number] (11/18)

## 2024-05-08 NOTE — TELEPHONE ENCOUNTER
Spoke with patient and relayed message below. She verbalized understanding and had no further questions or concerns.

## 2024-05-08 NOTE — PROGRESS NOTES
[facility] Care Coordination Contact    Member became effective with  [facility] on 5/1/24 with [facility].  Previous Health Plan: [facility]  Previous Care System: [facility]  Previous care coordinators name and number: HANDY Jasso Type: N/A  Last MMIS Entry: Date 03/22/24 and Type 07  MMIS visit date (and type) if different from above: NA  Services Listed in MMIS:   UTF received: Yes: Received and saved to member file  Mailed welcome letter and [facility] When to Contact Your Care Coordinator  Address/Phone discrepancy: NA  MnChoices: Revised    Wolf Roberts  [facility]  Case Management Specialist  [phone]

## 2024-05-09 ENCOUNTER — PATIENT OUTREACH (OUTPATIENT)
Dept: GERIATRIC MEDICINE | Facility: CLINIC | Age: 64
End: 2024-05-09
Payer: COMMERCIAL

## 2024-05-09 ASSESSMENT — ACTIVITIES OF DAILY LIVING (ADL): DEPENDENT_IADLS:: INDEPENDENT

## 2024-05-09 NOTE — Clinical Note
Mayra is new to [facility] Care Coordination starting 5/1/24.  Phone call to her today and she reports independence with all ADL/IADL's.  Has her own car and drives self to all destinations.  No falls reported.  Mayra discussed that she has chronic fatigue and inquired about homemaking services.  This service is only for people on Elderly Waiver and at a Nursing Facility Level of Care.  Mayra does not meet the requirements for Elderly Waiver at this time.  I will follow-up with her in 6 months unless there is a change in condition before that time.  Thank you, Mayra Dean RN [facility] [phone]

## 2024-05-10 NOTE — PROGRESS NOTES
[facility] Care Coordination Contact    [facility] Initial Assessment     Initial Transition Health Risk Assessment with Mayra Wheeler completed on May 9, 2024 by phone.    Type of residence:: Apartment - handicap accessible  Current living arrangement:: I live alone     Assessment completed with:: Patient    Current Care Plan  Member currently receiving the following home care services:   none  Member currently receiving the following community resources:  none    Medication Review  Medication reconciliation completed in Epic: No  Medication set-up & administration: Independent-does not set up.  Self-administers medications.  Medication Risk Assessment Medication (1 or more, place referral to MTM): N/A: No risk factors identified  MTM Referral Placed: No: No risk factors idenified    Mental/Behavioral Health   Depression Screening:   PHQ-2 Total Score (Adult) - Positive if 3 or more points; Administer PHQ-9 if positive: 1       Mental health DX:: Yes   Mental health DX how managed:: Medication, Outpatient Counseling    No current MH services-will place referral for Medication    Falls Assessment:   Fallen 2 or more times in the past year?: No   Any fall with injury in the past year?: No    ADL/IADL Dependencies:   Dependent ADLs:: Independent  Dependent IADLs:: Independent    [facility]: Shared information regarding [employer]. Reviewed preventative health screening and health plan supplemental benefits/incentives. Reviewed medication disposal form.    PCA Assessment completed at visit: Not Applicable     Elderly Waiver Eligibility: No-does not meet criteria    Care Plan & Recommendations: continue to monitor falls and report to PCP and care coordinator.    See LTCC for detailed assessment information.    Follow-Up Plan: Member informed of future contact, plan to f/u with member with a 6 month telephone assessment.  Contact information shared with  member and family, encouraged member to call with any questions or concerns at any time.    [facility] care continuum providers: Please see Snapshot and Care Management Flowsheets for Specific details of care plan.    This CC note routed to PCP, Louise Rivera.     Mayra Dean RN  [facility]  [phone]

## 2024-05-15 ENCOUNTER — PATIENT OUTREACH (OUTPATIENT)
Dept: GERIATRIC MEDICINE | Facility: CLINIC | Age: 64
End: 2024-05-15
Payer: COMMERCIAL

## 2024-05-15 NOTE — PROGRESS NOTES
[facility] Care Coordination Contact    Received a request to submit a DTR for the denied of Homemaking and EW. Documentation completed and faxed to the health plan. Care Coordinator aware.    Kamilah Arguello RN  Utilization   [facility]  [phone]

## 2024-05-18 ENCOUNTER — HEALTH MAINTENANCE LETTER (OUTPATIENT)
Age: 64
End: 2024-05-18

## 2024-06-07 ENCOUNTER — TELEPHONE (OUTPATIENT)
Dept: INTERNAL MEDICINE | Facility: CLINIC | Age: 64
End: 2024-06-07

## 2024-06-07 DIAGNOSIS — E11.9 TYPE 2 DIABETES MELLITUS WITHOUT COMPLICATION, WITHOUT LONG-TERM CURRENT USE OF INSULIN (H): Primary | ICD-10-CM

## 2024-06-07 NOTE — PROGRESS NOTES
"This patient has a laboratory appointment scheduled June 14, 24. The notes say \"A1c\". There are no open/active orders in the patient's chart.  Please order or advise patient. Thank You!  "

## 2024-06-14 ENCOUNTER — LAB (OUTPATIENT)
Dept: LAB | Facility: CLINIC | Age: 64
End: 2024-06-14
Payer: COMMERCIAL

## 2024-06-14 DIAGNOSIS — E11.9 TYPE 2 DIABETES MELLITUS WITHOUT COMPLICATION, WITHOUT LONG-TERM CURRENT USE OF INSULIN (H): ICD-10-CM

## 2024-06-14 LAB
ERYTHROCYTE [DISTWIDTH] IN BLOOD BY AUTOMATED COUNT: 12.4 % (ref 10–15)
HBA1C MFR BLD: 5.8 % (ref 0–5.6)
HCT VFR BLD AUTO: 38.7 % (ref 35–47)
HGB BLD-MCNC: 13.4 G/DL (ref 11.7–15.7)
MCH RBC QN AUTO: 29.6 PG (ref 26.5–33)
MCHC RBC AUTO-ENTMCNC: 34.6 G/DL (ref 31.5–36.5)
MCV RBC AUTO: 85 FL (ref 78–100)
PLATELET # BLD AUTO: 214 10E3/UL (ref 150–450)
RBC # BLD AUTO: 4.53 10E6/UL (ref 3.8–5.2)
WBC # BLD AUTO: 5.5 10E3/UL (ref 4–11)

## 2024-06-14 PROCEDURE — 80053 COMPREHEN METABOLIC PANEL: CPT

## 2024-06-14 PROCEDURE — 80061 LIPID PANEL: CPT

## 2024-06-14 PROCEDURE — 36415 COLL VENOUS BLD VENIPUNCTURE: CPT

## 2024-06-14 PROCEDURE — 83036 HEMOGLOBIN GLYCOSYLATED A1C: CPT

## 2024-06-14 PROCEDURE — 85027 COMPLETE CBC AUTOMATED: CPT

## 2024-06-15 LAB
ALBUMIN SERPL BCG-MCNC: 4.4 G/DL (ref 3.5–5.2)
ALP SERPL-CCNC: 80 U/L (ref 40–150)
ALT SERPL W P-5'-P-CCNC: 39 U/L (ref 0–50)
ANION GAP SERPL CALCULATED.3IONS-SCNC: 11 MMOL/L (ref 7–15)
AST SERPL W P-5'-P-CCNC: 32 U/L (ref 0–45)
BILIRUB SERPL-MCNC: 0.4 MG/DL
BUN SERPL-MCNC: 11.5 MG/DL (ref 8–23)
CALCIUM SERPL-MCNC: 9.6 MG/DL (ref 8.8–10.2)
CHLORIDE SERPL-SCNC: 106 MMOL/L (ref 98–107)
CHOLEST SERPL-MCNC: 206 MG/DL
CREAT SERPL-MCNC: 0.71 MG/DL (ref 0.51–0.95)
DEPRECATED HCO3 PLAS-SCNC: 26 MMOL/L (ref 22–29)
EGFRCR SERPLBLD CKD-EPI 2021: >90 ML/MIN/1.73M2
FASTING STATUS PATIENT QL REPORTED: NO
FASTING STATUS PATIENT QL REPORTED: NO
GLUCOSE SERPL-MCNC: 122 MG/DL (ref 70–99)
HDLC SERPL-MCNC: 70 MG/DL
LDLC SERPL CALC-MCNC: 83 MG/DL
NONHDLC SERPL-MCNC: 136 MG/DL
POTASSIUM SERPL-SCNC: 5 MMOL/L (ref 3.4–5.3)
PROT SERPL-MCNC: 7.5 G/DL (ref 6.4–8.3)
SODIUM SERPL-SCNC: 143 MMOL/L (ref 135–145)
TRIGL SERPL-MCNC: 267 MG/DL

## 2024-06-17 ENCOUNTER — PATIENT OUTREACH (OUTPATIENT)
Dept: CARE COORDINATION | Facility: CLINIC | Age: 64
End: 2024-06-17
Payer: COMMERCIAL

## 2024-06-19 ENCOUNTER — ANCILLARY PROCEDURE (OUTPATIENT)
Dept: MAMMOGRAPHY | Facility: CLINIC | Age: 64
End: 2024-06-19
Attending: INTERNAL MEDICINE
Payer: COMMERCIAL

## 2024-06-19 DIAGNOSIS — Z12.31 ENCOUNTER FOR SCREENING MAMMOGRAM FOR BREAST CANCER: ICD-10-CM

## 2024-06-19 PROCEDURE — 77063 BREAST TOMOSYNTHESIS BI: CPT

## 2024-07-08 ENCOUNTER — TELEPHONE (OUTPATIENT)
Dept: INTERNAL MEDICINE | Facility: CLINIC | Age: 64
End: 2024-07-08
Payer: COMMERCIAL

## 2024-07-08 DIAGNOSIS — I10 BENIGN ESSENTIAL HYPERTENSION: Primary | ICD-10-CM

## 2024-07-08 RX ORDER — LOSARTAN POTASSIUM 50 MG/1
50 TABLET ORAL DAILY
Qty: 90 TABLET | Refills: 0 | Status: SHIPPED | OUTPATIENT
Start: 2024-07-08

## 2024-07-08 NOTE — TELEPHONE ENCOUNTER
New Medication Request    Contacts       Contact Date/Time Type Contact Phone/Fax    07/08/2024 02:12 PM CDT Phone (Incoming) Mayra Wheeler (Self) [phone] (M)            What medication are you requesting?: Losartan 50MG    Reason for medication request: High bp    Have you taken this medication before?: Yes: Was previously getting it prescribed by a different doctor.     Controlled Substance Agreement on file:   CSA -- Patient Level:    CSA: None found at the patient level.         Patient offered an appointment? No    Preferred Pharmacy:       [facility]/pharmacy #[id number] - [address]  [address] [place] [address] E  [facility] [address]  Phone: [phone] Fax: [phone]      Could we send this information to you in [employer] or would you prefer to receive a phone call?:   Patient would prefer a phone call   Okay to leave a detailed message?: Yes at Home number on file [phone] (home)

## 2024-07-08 NOTE — TELEPHONE ENCOUNTER
Called pt to gather more information.     Blood pressure have been ranging from 125-140's/under 100's, per pt. Pt states she has been taking this medication and is inquiring about a refill. Writer apologize for the confusion. Pt is still taking Losartan 50mg daily.       Medication is shimon'd up for verification and approval, if appropriate.       Thank you,  Tim Garcia Jr., CMA on 7/8/2024 at 2:53 PM

## 2024-07-12 ENCOUNTER — TELEPHONE (OUTPATIENT)
Dept: INTERNAL MEDICINE | Facility: CLINIC | Age: 64
End: 2024-07-12
Payer: COMMERCIAL

## 2024-07-12 ENCOUNTER — ORDERS ONLY (AUTO-RELEASED) (OUTPATIENT)
Dept: INTERNAL MEDICINE | Facility: CLINIC | Age: 64
End: 2024-07-12
Payer: COMMERCIAL

## 2024-07-12 DIAGNOSIS — Z12.12 SCREENING FOR COLORECTAL CANCER: Primary | ICD-10-CM

## 2024-07-12 DIAGNOSIS — Z12.11 SCREENING FOR COLORECTAL CANCER: Primary | ICD-10-CM

## 2024-07-12 DIAGNOSIS — Z12.12 SCREENING FOR COLORECTAL CANCER: ICD-10-CM

## 2024-07-12 DIAGNOSIS — Z12.11 SCREENING FOR COLORECTAL CANCER: ICD-10-CM

## 2024-07-12 DIAGNOSIS — G47.33 OSA (OBSTRUCTIVE SLEEP APNEA): ICD-10-CM

## 2024-07-12 NOTE — TELEPHONE ENCOUNTER
General Call      Reason for Call:  Cologuard and Sleep clinic referral    What are your questions or concerns:  Patient calling wanting provider to order a Cologuard test kit to be sent to her house. She would also like a referral to a sleep medicine clinic.     Date of last appointment with provider: N/A    Could we send this information to you in [facility] or would you prefer to receive a phone call?:   No preference   Okay to leave a detailed message?: Yes at Home number on file [phone] (home)

## 2024-07-12 NOTE — TELEPHONE ENCOUNTER
Called and spoke to pt. Relayed that both orders have been completed.     Pt thankful for the call.       Tim Garcia Jr., CMA on 7/12/2024 at 4:45 PM

## 2024-07-12 NOTE — TELEPHONE ENCOUNTER
Last colon screening was CT colongraphy 11/23/20215:  IMPRESSION:  CONCLUSION:   1. Mild colonic tortuosity, redundancy and diverticulosis.  2. Colon is otherwise negative.  3. No AAA.  4. Nonobstructing stone right renal pelvis 10 x 10 x 5 mm. Consider Urology consultation.  5. Exophytic cyst arising from the right ovary which is situated in the right lower quadrant.  6. Moderate to severe diffuse hepatic steatosis.      Per notes 3/19/2024:  VONDA (obstructive sleep apnea)        Initially diagnosed 6 years ago in the setting of her new onset fatigue.  Had used a CPAP but was not sure of benefit so stopped.  We again discussed the importance of getting a repeat sleep study as untreated sleep apnea could certainly be contributing to her chronic fatigue and daytime sleepiness.  - Sleep medicine appt is scheduled for May         May sleep visit was canceled

## 2024-07-22 DIAGNOSIS — I10 BENIGN ESSENTIAL HYPERTENSION: ICD-10-CM

## 2024-07-22 RX ORDER — LOSARTAN POTASSIUM 50 MG/1
50 TABLET ORAL DAILY
Qty: 90 TABLET | Refills: 0 | OUTPATIENT
Start: 2024-07-22

## 2024-07-22 NOTE — TELEPHONE ENCOUNTER
FYI - Status Update    Who is Calling: patient    Update: 07/22/2024    The patient called, reporting that she had found her medication.  She no longer needs a refill on the Losartan.  Please cancel the request.      Does caller want a call/response back: No

## 2024-07-22 NOTE — TELEPHONE ENCOUNTER
Reason for Call:  Medication :  Lost prescription    Do you use a [facility]? Dietrich of the pharmacy and phone number for the current request:      [facility]/PHARMACY #[id number] - [address]       Name of the medication requested: losartan (COZAAR) 50 MG tablet     Other request: Patient is crying she has lost/misplaced her prescription for losartan.     Can we leave a detailed message on this number? YES    Phone number patient can be reached at: Cell number on file:    Telephone Information:   Mobile [phone]       Best Time: any    Call taken on 7/22/2024 at 2:52 PM by Leela Forman

## 2024-07-31 ENCOUNTER — TRANSFERRED RECORDS (OUTPATIENT)
Dept: MULTI SPECIALTY CLINIC | Facility: CLINIC | Age: 64
End: 2024-07-31
Payer: COMMERCIAL

## 2024-07-31 LAB — RETINOPATHY: NORMAL

## 2024-08-19 SDOH — HEALTH STABILITY: PHYSICAL HEALTH: ON AVERAGE, HOW MANY MINUTES DO YOU ENGAGE IN EXERCISE AT THIS LEVEL?: 40 MIN

## 2024-08-19 SDOH — HEALTH STABILITY: PHYSICAL HEALTH: ON AVERAGE, HOW MANY DAYS PER WEEK DO YOU ENGAGE IN MODERATE TO STRENUOUS EXERCISE (LIKE A BRISK WALK)?: 3 DAYS

## 2024-08-19 ASSESSMENT — ASTHMA QUESTIONNAIRES
ACT_TOTALSCORE: 23
QUESTION_2 LAST FOUR WEEKS HOW OFTEN HAVE YOU HAD SHORTNESS OF BREATH: ONCE OR TWICE A WEEK
QUESTION_1 LAST FOUR WEEKS HOW MUCH OF THE TIME DID YOUR ASTHMA KEEP YOU FROM GETTING AS MUCH DONE AT WORK, SCHOOL OR AT HOME: NONE OF THE TIME
QUESTION_5 LAST FOUR WEEKS HOW WOULD YOU RATE YOUR ASTHMA CONTROL: WELL CONTROLLED
QUESTION_4 LAST FOUR WEEKS HOW OFTEN HAVE YOU USED YOUR RESCUE INHALER OR NEBULIZER MEDICATION (SUCH AS ALBUTEROL): NOT AT ALL
ACT_TOTALSCORE: 23
QUESTION_3 LAST FOUR WEEKS HOW OFTEN DID YOUR ASTHMA SYMPTOMS (WHEEZING, COUGHING, SHORTNESS OF BREATH, CHEST TIGHTNESS OR PAIN) WAKE YOU UP AT NIGHT OR EARLIER THAN USUAL IN THE MORNING: NOT AT ALL

## 2024-08-19 ASSESSMENT — PATIENT HEALTH QUESTIONNAIRE - PHQ9
SUM OF ALL RESPONSES TO PHQ QUESTIONS 1-9: 5
SUM OF ALL RESPONSES TO PHQ QUESTIONS 1-9: 5
10. IF YOU CHECKED OFF ANY PROBLEMS, HOW DIFFICULT HAVE THESE PROBLEMS MADE IT FOR YOU TO DO YOUR WORK, TAKE CARE OF THINGS AT HOME, OR GET ALONG WITH OTHER PEOPLE: NOT DIFFICULT AT ALL

## 2024-08-19 ASSESSMENT — SOCIAL DETERMINANTS OF HEALTH (SDOH): HOW OFTEN DO YOU GET TOGETHER WITH FRIENDS OR RELATIVES?: ONCE A WEEK

## 2024-08-20 ENCOUNTER — OFFICE VISIT (OUTPATIENT)
Dept: INTERNAL MEDICINE | Facility: CLINIC | Age: 64
End: 2024-08-20
Payer: COMMERCIAL

## 2024-08-20 VITALS
HEIGHT: 67 IN | WEIGHT: 213.7 LBS | SYSTOLIC BLOOD PRESSURE: 116 MMHG | BODY MASS INDEX: 33.54 KG/M2 | OXYGEN SATURATION: 96 % | HEART RATE: 65 BPM | DIASTOLIC BLOOD PRESSURE: 64 MMHG | RESPIRATION RATE: 16 BRPM | TEMPERATURE: 98.4 F

## 2024-08-20 DIAGNOSIS — R53.82 CHRONIC FATIGUE: ICD-10-CM

## 2024-08-20 DIAGNOSIS — I10 BENIGN ESSENTIAL HYPERTENSION: ICD-10-CM

## 2024-08-20 DIAGNOSIS — F41.9 ANXIETY: ICD-10-CM

## 2024-08-20 DIAGNOSIS — E78.2 MIXED HYPERLIPIDEMIA: ICD-10-CM

## 2024-08-20 DIAGNOSIS — Z78.0 ASYMPTOMATIC MENOPAUSE: ICD-10-CM

## 2024-08-20 DIAGNOSIS — F25.9 SCHIZOAFFECTIVE DISORDER, UNSPECIFIED TYPE (H): ICD-10-CM

## 2024-08-20 DIAGNOSIS — R42 VERTIGO: ICD-10-CM

## 2024-08-20 DIAGNOSIS — G47.33 OSA (OBSTRUCTIVE SLEEP APNEA): ICD-10-CM

## 2024-08-20 DIAGNOSIS — E11.9 TYPE 2 DIABETES MELLITUS WITHOUT COMPLICATION, WITHOUT LONG-TERM CURRENT USE OF INSULIN (H): ICD-10-CM

## 2024-08-20 DIAGNOSIS — K58.2 IRRITABLE BOWEL SYNDROME WITH BOTH CONSTIPATION AND DIARRHEA: ICD-10-CM

## 2024-08-20 DIAGNOSIS — J31.0 NON-ALLERGIC RHINITIS: ICD-10-CM

## 2024-08-20 DIAGNOSIS — F33.41 RECURRENT MAJOR DEPRESSIVE DISORDER, IN PARTIAL REMISSION (H): ICD-10-CM

## 2024-08-20 DIAGNOSIS — Z00.00 ENCOUNTER FOR MEDICARE ANNUAL WELLNESS EXAM: Primary | ICD-10-CM

## 2024-08-20 PROBLEM — K58.9 IRRITABLE BOWEL SYNDROME: Status: RESOLVED | Noted: 2018-11-04 | Resolved: 2024-08-20

## 2024-08-20 PROCEDURE — G0439 PPPS, SUBSEQ VISIT: HCPCS | Performed by: INTERNAL MEDICINE

## 2024-08-20 PROCEDURE — 99214 OFFICE O/P EST MOD 30 MIN: CPT | Mod: 25 | Performed by: INTERNAL MEDICINE

## 2024-08-20 ASSESSMENT — PAIN SCALES - GENERAL: PAINLEVEL: MILD PAIN (2)

## 2024-08-20 NOTE — ASSESSMENT & PLAN NOTE
We discussed healthy lifestyle, nutrition, cardiovascular risk reduction, self care, safety, sunscreen, and timing of cancer screening.  Health maintenance screening and immunizations reviewed with the patient.   - S/p hysterectomy in 1992  - Cologuard ordered, she has at home, will send in  - Mammo BIRADS1 6/19/24.   - Exercise as tolerated (as per CFS)  - Will update labs today  - Recommended shingles, flu, COVID and RSV vaccines at pharmacy   - Follow up yearly for the annual physical.

## 2024-08-20 NOTE — ASSESSMENT & PLAN NOTE
Initially diagnosed 6 years ago in the setting of her new onset fatigue.  Had used a CPAP but was not sure of benefit so stopped.  We again discussed the importance of getting a repeat sleep study as untreated sleep apnea could certainly be contributing to her chronic fatigue and daytime sleepiness.  - Sleep medicine appt is scheduled for October

## 2024-08-20 NOTE — ASSESSMENT & PLAN NOTE
Dizziness under much better control with rest and working to slowly increase exercise tolerance. More in line with treatment of CFS.   - Continue her current plan for mgmt of CFS  - If dizziness worsens, could pursue PT again in the future

## 2024-08-20 NOTE — ASSESSMENT & PLAN NOTE
Well-controlled on current regimen, last lipids 3/2023 showed LDL of 74.  - Continue pravastatin 40 mg daily  - Repeat lipids today, goal LDL <100

## 2024-08-20 NOTE — ASSESSMENT & PLAN NOTE
Symptoms stable. Alternating diarrhea and constipation.   - Recommended working on hydration, goal 64 oz a day  - Recommended fiber supplement like metamucil and/or increasing dietary fiber

## 2024-08-20 NOTE — PROGRESS NOTES
Preventive Care Visit  [facility] Cindy Rivera MD, Internal Medicine  Aug 20, 2024      Assessment & Plan   Problem List Items Addressed This Visit          Respiratory    Non-allergic rhinitis     Stable. Continue astelin nasal spray.          VONDA (obstructive sleep apnea)     Initially diagnosed 6 years ago in the setting of her new onset fatigue.  Had used a CPAP but was not sure of benefit so stopped.  We again discussed the importance of getting a repeat sleep study as untreated sleep apnea could certainly be contributing to her chronic fatigue and daytime sleepiness.  - Sleep medicine appt is scheduled for October            Digestive    IBS (irritable bowel syndrome)     Symptoms stable. Alternating diarrhea and constipation.   - Recommended working on hydration, goal 64 oz a day  - Recommended fiber supplement like metamucil and/or increasing dietary fiber            Endocrine    Hyperlipidemia     Well-controlled on current regimen, last lipids 3/2023 showed LDL of 74.  - Continue pravastatin 40 mg daily  - Repeat lipids today, goal LDL <100         Diabetes mellitus, type 2 (H)     Well-controlled on current regimen of metformin 1g in AM and 500 mg in PM.  A1c 5.8 2/2024  - Would continue same dose of metformin  - Eye exam normal 7/2024  - Foot exam normal today          Relevant Orders    PA FOOT EXAM NO CHARGE (Completed)       Circulatory    Benign essential hypertension     Well-controlled on current regimen today.  - Continue losartan 50 mg daily            Behavioral    Depression     Also follows with psychiatry for this.  Current regimen Zoloft 200 mg daily.         Schizoaffective disorder (H)     Follows with CHER Alarcon at [facility] in [place]   -Continue perphenazine 8 mg at bedtime, will leave management to her psychiatric provider            Other    Anxiety     Also follows with psychiatry for this.  In addition to Zoloft 200 mg daily,  "regimen includes gabapentin 600 mg at bedtime and trazodone 100 mg at bedtime.    -Defer management to her psychiatrist         Vertigo     Dizziness under much better control with rest and working to slowly increase exercise tolerance. More in line with treatment of CFS.   - Continue her current plan for mgmt of CFS  - If dizziness worsens, could pursue PT again in the future         Chronic fatigue     Chronic fatigue is most c/w CFS. Discussed again today. Patient has been able to get better control of dizziness with resting and being more mindful.   - continue to recommend sleep study, scheduled for October  - Discussed graded exercise, she is working on this  - Also provided info on ME/CFS [place] [facility] [place] website          Encounter for Medicare annual wellness exam - Primary     We discussed healthy lifestyle, nutrition, cardiovascular risk reduction, self care, safety, sunscreen, and timing of cancer screening.  Health maintenance screening and immunizations reviewed with the patient.   - S/p hysterectomy in 1992  - Cologuard ordered, she has at home, will send in  - Mammo BIRADS1 6/19/24.   - Exercise as tolerated (as per CFS)  - Will update labs today  - Recommended shingles, flu, COVID and RSV vaccines at pharmacy   - Follow up yearly for the annual physical.          Other Visit Diagnoses       Asymptomatic menopause        Relevant Orders    DEXA HIP/PELVIS/SPINE - Future           Patient has been advised of split billing requirements and indicates understanding: Yes    BMI  Estimated body mass index is 33.98 kg/m  as calculated from the following:    Height as of this encounter: 1.689 m (5' 6.5\").    Weight as of this encounter: 96.9 kg (213 lb 11.2 oz).   Weight management plan: Discussed healthy diet and exercise guidelines    Counseling  Appropriate preventive services were addressed with this patient via screening, questionnaire, or discussion as appropriate for fall prevention, nutrition, physical " activity, Tobacco-use cessation, social engagement, weight loss and cognition.  Checklist reviewing preventive services available has been given to the patient.  Reviewed patient's diet, addressing concerns and/or questions.   She is at risk for lack of exercise and has been provided with information to increase physical activity for the benefit of her well-being.   Discussed possible causes of fatigue. Updated plan of care.  Patient reported difficulty with activities of daily living were addressed today.The patient was provided with written information regarding signs of hearing loss.   Information on urinary incontinence and treatment options given to patient.   The patient's PHQ-9 score is consistent with mild depression. She was provided with information regarding depression.     FUTURE APPOINTMENTS:       - Follow-up visit in 5-6 months       Vernell Nunez is a 64 year old, presenting for the following:  Wellness Visit (Patient states eye exam was done 07/19/2024. )        8/20/2024     1:41 PM   Additional Questions   Roomed by Harpal VIDALES MA         Via the Health Maintenance questionnaire, the patient has reported the following services have been completed -Eye Exam: [facility] 2024-07-31, this information has been sent to the abstraction team.  Health Care Directive  Patient does not have a Health Care Directive or Living Will: Discussed advance care planning with patient; however, patient declined at this time.    HPI    Mayra is here for AWV today. Overall doing a little better. We reviewed history as below.     Vertigo / dizziness / fatigue: last visit strongly recommended sleep study and vestibular PT.   - Tells me today she didn't do vestibular PT because she discovered if she rests she is able to control the dizziness    VONDA: Sleep visit now scheduled 10/23/24    HTN: losartan 50 mg daily. BP today 116/64.     T2DM: Metformin 1g AM and 500 mg PM. A1c was 5.8 (6/2024). No albuminuria 3/2024. Eye  exam without retinopathy 7/19/24.     HLD: pravastatin 40 mg daily. Lipids 6/2024 Tchol 206, , LDL 83, HDL 70    Schizoaffective disorder: Current regimen is Perphenazine 8 mg at bedtime on this since 1988, stable on this dose for many years. This is managed by CHER Alarcon at [facility] in [place].      MDD: zoloft 200 mg daily, also managed by psychiatry     Insomnia: trazodone 100 mg at bedtime, managed by psychiatry    Anxiety: gabapentin 600 mg at bedtime and trazodone 100 mg at bedtime. Also managed by psychiatry.      AR: astelin nasal spray     HCM: cologuard ordered 7/12/24. Mammo BIRADS1 6/19/24.         8/19/2024   General Health   How would you rate your overall physical health? Good   Feel stress (tense, anxious, or unable to sleep) To some extent            8/19/2024   Nutrition   Diet: Diabetic            8/19/2024   Exercise   Days per week of moderate/strenous exercise 3 days   Average minutes spent exercising at this level 40 min            8/19/2024   Social Factors   Frequency of gathering with friends or relatives Once a week   Worry food won't last until get money to buy more No   Food not last or not have enough money for food? No   Do you have housing? (Housing is defined as stable permanent housing and does not include staying ouside in a car, in a tent, in an abandoned building, in an overnight shelter, or couch-surfing.) Yes   Are you worried about losing your housing? No   Lack of transportation? No   Unable to get utilities (heat,electricity)? No            8/19/2024   Fall Risk   Fallen 2 or more times in the past year? No   Trouble with walking or balance? No             8/19/2024   Activities of Daily Living- Home Safety   Needs help with the following daily activites Preparing meals    Housework    Laundry   Safety concerns in the home None of the above       Multiple values from one day are sorted in reverse-chronological order         8/19/2024   Dental    Dentist two times every year? Yes            2024   Hearing Screening   Hearing concerns? (!) TROUBLE UNDERSTANDING SOFT OR WHISPERED SPEECH.            2024   Driving Risk Screening   Patient/family members have concerns about driving No            2024   General Alertness/Fatigue Screening   Have you been more tired than usual lately? (!) YES            2024   Urinary Incontinence Screening   Bothered by leaking urine in past 6 months Yes            2024   TB Screening   Were you born outside of the [place]? No          Today's PHQ-9 Score:       2024     4:04 PM   PHQ-9 SCORE   PHQ-9 Total Score MyChart 5 (Mild depression)   PHQ-9 Total Score 5         2024   Substance Use   Alcohol more than 3/day or more than 7/wk No   Do you have a current opioid prescription? No   How severe/bad is pain from 1 to 10? 2/10   Do you use any other substances recreationally? No        Social History     Tobacco Use    Smoking status: Former     Current packs/day: 0.00     Types: Cigarettes     Quit date: 1997     Years since quittin.6    Smokeless tobacco: Never    Tobacco comments:     Quit 15-20 years ago   Substance Use Topics    Alcohol use: No    Drug use: No           2024   LAST FHS-7 RESULTS   1st degree relative breast or ovarian cancer Yes   Any relative bilateral breast cancer No   Any male have breast cancer No   Any ONE woman have BOTH breast AND ovarian cancer No   Any woman with breast cancer before 50yrs Yes   2 or more relatives with breast AND/OR ovarian cancer No   2 or more relatives with breast AND/OR bowel cancer No      Mammogram Screening - Mammogram every 1-2 years updated in Health Maintenance based on mutual decision making          2024   One time HIV Screening   Previous HIV test? No      History of abnormal Pap smear: Status post hysterectomy with removal of cervix and no history of CIN2 or greater or cervical cancer. Health Maintenance and Surgical  History updated.       ASCVD Risk   The 10-year ASCVD risk score ([place] SILVESTRE, et al., 2019) is: 10.6%    Values used to calculate the score:      Age: 64 years      Sex: Female      Is Non- : No      Diabetic: Yes      Tobacco smoker: No      Systolic Blood Pressure: 116 mmHg      Is BP treated: Yes      HDL Cholesterol: 70 mg/dL      Total Cholesterol: 206 mg/dL    Reviewed and updated as needed this visit by Provider   Tobacco  Allergies  Meds  Problems  Med Hx  Surg Hx  Fam Hx     Sexual Activity          Past Medical History:   Diagnosis Date    Arthritis     Depressive disorder     Diabetes (H)     Hypertension     Uncomplicated asthma      Past Surgical History:   Procedure Laterality Date     SECTION       SECTION      1 time    COLONOSCOPY      COMBINED CYSTOSCOPY, INSERT STENT URETER(S) Right 2016    Procedure: CYSTOSCOPY, RIGHT URETEROSCOPY, HOLMIUM LASER LITHOTRIPSY AND RIGHT STENT PLACEMENT;  Surgeon: Keven Holland MD;  Location: [facility] [place];  Service:     HYSTERECTOMY  1992    TUBAL LIGATION       Current providers sharing in care for this patient include:  Patient Care Team:  Louise Rivera MD as PCP - General (Internal Medicine)  Louise Rivera MD as Assigned PCP  Mayra Dean RN as Lead Care Coordinator (Primary Care - CC)    The following health maintenance items are reviewed in Epic and correct as of today:  Health Maintenance   Topic Date Due    DEXA  Never done    ANNUAL REVIEW OF HM ORDERS  Never done    ASTHMA ACTION PLAN  Never done    EYE EXAM  Never done    HIV SCREENING  Never done    HEPATITIS C SCREENING  Never done    ZOSTER IMMUNIZATION (1 of 2) Never done    COLORECTAL CANCER SCREENING  2020    COVID-19 Vaccine ( season) 2024    INFLUENZA VACCINE (1) 2024    A1C  2024    ASTHMA CONTROL TEST  2025    PHQ-9  2025    MICROALBUMIN   "03/19/2025    BMP  06/14/2025    LIPID  06/14/2025    MEDICARE ANNUAL WELLNESS VISIT  08/20/2025    DIABETIC FOOT EXAM  08/20/2025    FALL RISK ASSESSMENT  08/20/2025    MAMMO SCREENING  06/19/2026    DTAP/TDAP/TD IMMUNIZATION (3 - Td or Tdap) 02/22/2027    ADVANCE CARE PLANNING  08/20/2029    DEPRESSION ACTION PLAN  Completed    Pneumococcal Vaccine: 65+ Years  Completed    RSV VACCINE (Pregnancy & 60+)  Completed    IPV IMMUNIZATION  Aged Out    HPV IMMUNIZATION  Aged Out    MENINGITIS IMMUNIZATION  Aged Out    RSV MONOCLONAL ANTIBODY  Aged Out         Review of Systems  Constitutional, neuro, ENT, endocrine, pulmonary, cardiac, gastrointestinal, genitourinary, musculoskeletal, integument and psychiatric systems are negative, except as otherwise noted.     Objective    Exam  /64 (BP Location: Right arm, Patient Position: Sitting, Cuff Size: Adult Regular)   Pulse 65   Temp 98.4  F (36.9  C) (Oral)   Resp 16   Ht 1.689 m (5' 6.5\")   Wt 96.9 kg (213 lb 11.2 oz)   SpO2 96%   BMI 33.98 kg/m     Estimated body mass index is 33.98 kg/m  as calculated from the following:    Height as of this encounter: 1.689 m (5' 6.5\").    Weight as of this encounter: 96.9 kg (213 lb 11.2 oz).    Physical Exam  GENERAL: alert and no distress  EYES: Eyes grossly normal to inspection, PERRL and conjunctivae and sclerae normal  HENT: ear canals and TM's normal, nose and mouth without ulcers or lesions  NECK: no adenopathy, no asymmetry, masses, or scars  RESP: lungs clear to auscultation - no rales, rhonchi or wheezes  CV: regular rate and rhythm, normal S1 S2, no S3 or S4, no murmur, click or rub, no peripheral edema  ABDOMEN: soft, nontender, no hepatosplenomegaly, no masses and bowel sounds normal  MS: no gross musculoskeletal defects noted, no edema  SKIN: no suspicious lesions or rashes  NEURO: Normal strength and tone, mentation intact and speech normal  PSYCH: mentation appears normal, affect normal/bright         " 8/20/2024   Mini Cog   Clock Draw Score 2 Normal   3 Item Recall 3 objects recalled   Mini Cog Total Score 5               Signed Electronically by: Louise Rivera MD    Answers submitted by the patient for this visit:  Patient Health Questionnaire (Submitted on 8/19/2024)  If you checked off any problems, how difficult have these problems made it for you to do your work, take care of things at home, or get along with other people?: Not difficult at all  PHQ9 TOTAL SCORE: 5

## 2024-08-20 NOTE — ASSESSMENT & PLAN NOTE
Also follows with psychiatry for this.  In addition to Zoloft 200 mg daily, regimen includes gabapentin 600 mg at bedtime and trazodone 100 mg at bedtime.    -Defer management to her psychiatrist

## 2024-08-20 NOTE — ASSESSMENT & PLAN NOTE
Follows with CHER Alarcon at [facility] in [place]   -Continue perphenazine 8 mg at bedtime, will leave management to her psychiatric provider

## 2024-08-20 NOTE — ASSESSMENT & PLAN NOTE
Chronic fatigue is most c/w CFS. Discussed again today. Patient has been able to get better control of dizziness with resting and being more mindful.   - continue to recommend sleep study, scheduled for October  - Discussed graded exercise, she is working on this  - Also provided info on ME/CFS [place] [facility] [place] website

## 2024-08-20 NOTE — LETTER
My Depression Action Plan  Name: Mayra Wheeler   Date of Birth 1960  Date: 8/20/2024    My doctor: Louise Rivera   My clinic: [address] [address]  [phone]            GREEN    ZONE   Good Control    What it looks like:   Things are going generally well. You have normal ups and downs. You may even feel depressed from time to time, but bad moods usually last less than a day.   What you need to do:  Continue to care for yourself (see self care plan)  Check your depression survival kit and update it as needed  Follow your physician s recommendations including any medication.  Do not stop taking medication unless you consult with your physician first.             YELLOW         ZONE Getting Worse    What it looks like:   Depression is starting to interfere with your life.   It may be hard to get out of bed; you may be starting to isolate yourself from others.  Symptoms of depression are starting to last most all day and this has happened for several days.   You may have suicidal thoughts but they are not constant.   What you need to do:     Call your care team. Your response to treatment will improve if you keep your care team informed of your progress. Yellow periods are signs an adjustment may need to be made.     Continue your self-care.  Just get dressed and ready for the day.  Don't give yourself time to talk yourself out of it.    Talk to someone in your support network.    Open up your Depression Self-Care Plan/Wellness Kit.             RED    ZONE Medical Alert - Get Help    What it looks like:   Depression is seriously interfering with your life.   You may experience these or other symptoms: You can t get out of bed most days, can t work or engage in other necessary activities, you have trouble taking care of basic hygiene, or basic responsibilities, thoughts of suicide or death that will not go away,  self-injurious behavior.     What you need to do:  Call your care team and request a same-day appointment. If they are not available (weekends or after hours) call your local crisis line, emergency room or 911.          Depression Self-Care Plan / Wellness Kit    Many people find that medication and therapy are helpful treatments for managing depression. In addition, making small changes to your everyday life can help to boost your mood and improve your wellbeing. Below are some tips for you to consider. Be sure to talk with your medical provider and/or behavioral health consultant if your symptoms are worsening or not improving.     Sleep   Sleep hygiene  means all of the habits that support good, restful sleep. It includes maintaining a consistent bedtime and wake time, using your bedroom only for sleeping or sex, and keeping the bedroom dark and free of distractions like a computer, smartphone, or television.     Develop a Healthy Routine  Maintain good hygiene. Get out of bed in the morning, make your bed, brush your teeth, take a shower, and get dressed. Don t spend too much time viewing media that makes you feel stressed. Find time to relax each day.    Exercise  Get some form of exercise every day. This will help reduce pain and release endorphins, the  feel good  chemicals in your brain. It can be as simple as just going for a walk or doing some gardening, anything that will get you moving.      Diet  Strive to eat healthy foods, including fruits and vegetables. Drink plenty of water. Avoid excessive sugar, caffeine, alcohol, and other mood-altering substances.     Stay Connected with Others  Stay in touch with friends and family members.    Manage Your Mood  Try deep breathing, massage therapy, biofeedback, or meditation. Take part in fun activities when you can. Try to find something to smile about each day.     Psychotherapy  Be open to working with a therapist if your provider recommends it.      Medication  Be sure to take your medication as prescribed. Most anti-depressants need to be taken every day. It usually takes several weeks for medications to work. Not all medicines work for all people. It is important to follow-up with your provider to make sure you have a treatment plan that is working for you. Do not stop your medication abruptly without first discussing it with your provider.    Crisis Resources   These hotlines are for both adults and children. They and are open 24 hours a day, 7 days a week unless noted otherwise.    National Suicide Prevention Lifeline   988 or 2-027-847-RYIH (9413)    Crisis Text Line    www.crisistextline.org  Text HOME to 519790 from anywhere in the [place], anytime, about any type of crisis. A live, trained crisis counselor will receive the text and respond quickly.    [place] Lifeline for LGBTQ Youth  A national crisis intervention and suicide lifeline for LGBTQ youth under 25. Provides a safe place to talk without judgement. Call 1-463.267.2872; text START to 028834 or visit www.thetrevorproject.org to talk to a trained counselor.    For [facility] crisis numbers, visit the [facility] website at:  https://[place].gov/dhs/people-we-serve/adults/health-care/mental-health/resources/crisis-contacts.jsp

## 2024-08-20 NOTE — PATIENT INSTRUCTIONS
[email or web address]    [place] [facility] [place]     Goal 64 ounces of fluids daily.     You will get a call to schedule bone density (DEXA) scan.     Try compression stockings and elevating legs. If veins still feel heavy with doing this, let me know and I can place referral to vein doctors.     Continue all meds, let us know if you need refills.     Vaccines:   - In September/October: updated COVID and flu vaccines  - Anytime (separate from COVID and flu by at least 2 weeks): shingles which is two vaccines at least two months apart    Patient Education   Preventive Care Advice   This is general advice given by our system to help you stay healthy. However, your care team may have specific advice just for you. Please talk to your care team about your preventive care needs.  Nutrition  Eat 5 or more servings of fruits and vegetables each day.  Try wheat bread, brown rice and whole grain pasta (instead of white bread, rice, and pasta).  Get enough calcium and vitamin D. Check the label on foods and aim for 100% of the RDA (recommended daily allowance).  Lifestyle  Exercise at least 150 minutes each week  (30 minutes a day, 5 days a week).  Do muscle strengthening activities 2 days a week. These help control your weight and prevent disease.  No smoking.  Wear sunscreen to prevent skin cancer.  Have a dental exam and cleaning every 6 months.  Yearly exams  See your health care team every year to talk about:  Any changes in your health.  Any medicines your care team has prescribed.  Preventive care, family planning, and ways to prevent chronic diseases.  Shots (vaccines)   HPV shots (up to age 26), if you've never had them before.  Hepatitis B shots (up to age 59), if you've never had them before.  COVID-19 shot: Get this shot when it's due.  Flu shot: Get a flu shot every year.  Tetanus shot: Get a tetanus shot every 10 years.  Pneumococcal, hepatitis A, and RSV shots: Ask your care team if you need  these based on your risk.  Shingles shot (for age 50 and up)  General health tests  Diabetes screening:  Starting at age 35, Get screened for diabetes at least every 3 years.  If you are younger than age 35, ask your care team if you should be screened for diabetes.  Cholesterol test: At age 39, start having a cholesterol test every 5 years, or more often if advised.  Bone density scan (DEXA): At age 50, ask your care team if you should have this scan for osteoporosis (brittle bones).  Hepatitis C: Get tested at least once in your life.  STIs (sexually transmitted infections)  Before age 24: Ask your care team if you should be screened for STIs.  After age 24: Get screened for STIs if you're at risk. You are at risk for STIs (including HIV) if:  You are sexually active with more than one person.  You don't use condoms every time.  You or a partner was diagnosed with a sexually transmitted infection.  If you are at risk for HIV, ask about PrEP medicine to prevent HIV.  Get tested for HIV at least once in your life, whether you are at risk for HIV or not.  Cancer screening tests  Cervical cancer screening: If you have a cervix, begin getting regular cervical cancer screening tests starting at age 21.  Breast cancer scan (mammogram): If you've ever had breasts, begin having regular mammograms starting at age 40. This is a scan to check for breast cancer.  Colon cancer screening: It is important to start screening for colon cancer at age 45.  Have a colonoscopy test every 10 years (or more often if you're at risk) Or, ask your provider about stool tests like a FIT test every year or Cologuard test every 3 years.  To learn more about your testing options, visit:   .  For help making a decision, visit:   https://bit.ly/zn85937.  Prostate cancer screening test: If you have a prostate, ask your care team if a prostate cancer screening test (PSA) at age 55 is right for you.  Lung cancer screening: If you are a current or  former smoker ages 50 to 80, ask your care team if ongoing lung cancer screenings are right for you.  For informational purposes only. Not to replace the advice of your health care provider. Copyright   2023 [facility]. All rights reserved. Clinically reviewed by the [facility] Transitions Program. [employer]Cloud [id number] - REV 01/24.  Learning About Activities of Daily Living  What are activities of daily living?     Activities of daily living (ADLs) are the basic self-care tasks you do every day. These include eating, bathing, dressing, and moving around.  As you age, and if you have health problems, you may find that it's harder to do some of these tasks. If so, your doctor can suggest ideas that may help.  To measure what kind of help you may need, your doctor will ask how well you are able to do ADLs. Let your doctor know if there are any tasks that you are having trouble doing. This is an important first step to getting help. And when you have the help you need, you can stay as independent as possible.  How will a doctor assess your ADLs?  Asking about ADLs is part of a routine health checkup your doctor will likely do as you age. Your health check might be done in a doctor's office, in your home, or at a hospital. The goal is to find out if you are having any problems that could make it hard to care for yourself or that make it unsafe for you to be on your own.  To measure your ADLs, your doctor will ask how hard it is for you to do routine tasks. Your doctor may also want to know if you have changed the way you do a task because of a health problem. Your doctor may watch how you:  Walk back and forth.  Keep your balance while you stand or walk.  Move from sitting to standing or from a bed to a chair.  Button or unbutton a shirt or sweater.  Remove and put on your shoes.  It's common to feel a little worried or anxious if you find you can't do all the things you used to be able to do. Talking  with your doctor about ADLs is a way to make sure you're as safe as possible and able to care for yourself as well as you can. You may want to bring a caregiver, friend, or family member to your checkup. They can help you talk to your doctor.  Follow-up care is a key part of your treatment and safety. Be sure to make and go to all appointments, and call your doctor if you are having problems. It's also a good idea to know your test results and keep a list of the medicines you take.  Current as of: October 24, 2023  Content Version: 14.1    7401-6199 [employer].   Care instructions adapted under license by your healthcare professional. If you have questions about a medical condition or this instruction, always ask your healthcare professional. [employer] disclaims any warranty or liability for your use of this information.    Hearing Loss: Care Instructions  Overview     Hearing loss is a sudden or slow decrease in how well you hear. It can range from slight to profound. Permanent hearing loss can occur with aging. It also can happen when you are exposed long-term to loud noise. Examples include listening to loud music, riding motorcycles, or being around other loud machines.  Hearing loss can affect your work and home life. It can make you feel lonely or depressed. You may feel that you have lost your independence. But hearing aids and other devices can help you hear better and feel connected to others.  Follow-up care is a key part of your treatment and safety. Be sure to make and go to all appointments, and call your doctor if you are having problems. It's also a good idea to know your test results and keep a list of the medicines you take.  How can you care for yourself at home?  Avoid loud noises whenever possible. This helps keep your hearing from getting worse.  Always wear hearing protection around loud noises.  Wear a hearing aid as directed.  A professional can help you pick a  "hearing aid that will work best for you.  You can also get hearing aids over the counter for mild to moderate hearing loss.  Have hearing tests as your doctor suggests. They can show whether your hearing has changed. Your hearing aid may need to be adjusted.  Use other devices as needed. These may include:  Telephone amplifiers and hearing aids that can connect to a television, stereo, radio, or microphone.  Devices that use lights or vibrations. These alert you to the doorbell, a ringing telephone, or a baby monitor.  Television closed-captioning. This shows the words at the bottom of the screen. Most new TVs can do this.  TTY (text telephone). This lets you type messages back and forth on the telephone instead of talking or listening. These devices are also called TDD. When messages are typed on the keyboard, they are sent over the phone line to a receiving TTY. The message is shown on a monitor.  Use text messaging, social media, and email if it is hard for you to communicate by telephone.  Try to learn a listening technique called speechreading. It is not lipreading. You pay attention to people's gestures, expressions, posture, and tone of voice. These clues can help you understand what a person is saying. Face the person you are talking to, and have them face you. Make sure the lighting is good. You need to see the other person's face clearly.  Think about counseling if you need help to adjust to your hearing loss.  When should you call for help?  Watch closely for changes in your health, and be sure to contact your doctor if:    You think your hearing is getting worse.     You have new symptoms, such as dizziness or nausea.   Where can you learn more?  Go to https://www.MoveEZ.net/patiented  Enter R798 in the search box to learn more about \"Hearing Loss: Care Instructions.\"  Current as of: September 27, 2023               Content Version: 14.0    3837-8980 [employer].   Care instructions " adapted under license by your healthcare professional. If you have questions about a medical condition or this instruction, always ask your healthcare professional. Healthwise, [facility] disclaims any warranty or liability for your use of this information.      Learning About Sleeping Well  What does sleeping well mean?     Sleeping well means getting enough sleep to feel good and stay healthy. How much sleep is enough varies among people.  The number of hours you sleep and how you feel when you wake up are both important. If you do not feel refreshed, you probably need more sleep. Another sign of not getting enough sleep is feeling tired during the day.  Experts recommend that adults get at least 7 or more hours of sleep per day. Children and older adults need more sleep.  Why is getting enough sleep important?  Getting enough quality sleep is a basic part of good health. When your sleep suffers, your physical health, mood, and your thoughts can suffer too. You may find yourself feeling more grumpy or stressed. Not getting enough sleep also can lead to serious problems, including injury, accidents, anxiety, and depression.  What might cause poor sleeping?  Many things can cause sleep problems, including:  Changes to your sleep schedule.  Stress. Stress can be caused by fear about a single event, such as giving a speech. Or you may have ongoing stress, such as worry about work or school.  Depression, anxiety, and other mental or emotional conditions.  Changes in your sleep habits or surroundings. This includes changes that happen where you sleep, such as noise, light, or sleeping in a different bed. It also includes changes in your sleep pattern, such as having jet lag or working a late shift.  Health problems, such as pain, breathing problems, and restless legs syndrome.  Lack of regular exercise.  Using alcohol, nicotine, or caffeine before bed.  How can you help yourself?  Here are some tips that may help you  "sleep more soundly and wake up feeling more refreshed.  Your sleeping area   Use your bedroom only for sleeping and sex. A bit of light reading may help you fall asleep. But if it doesn't, do your reading elsewhere in the house. Try not to use your TV, computer, smartphone, or tablet while you are in bed.  Be sure your bed is big enough to stretch out comfortably, especially if you have a sleep partner.  Keep your bedroom quiet, dark, and cool. Use curtains, blinds, or a sleep mask to block out light. To block out noise, use earplugs, soothing music, or a \"white noise\" machine.  Your evening and bedtime routine   Create a relaxing bedtime routine. You might want to take a warm shower or bath, or listen to soothing music.  Go to bed at the same time every night. And get up at the same time every morning, even if you feel tired.  What to avoid   Limit caffeine (coffee, tea, caffeinated sodas) during the day, and don't have any for at least 6 hours before bedtime.  Avoid drinking alcohol before bedtime. Alcohol can cause you to wake up more often during the night.  Try not to smoke or use tobacco, especially in the evening. Nicotine can keep you awake.  Limit naps during the day, especially close to bedtime.  Avoid lying in bed awake for too long. If you can't fall asleep or if you wake up in the middle of the night and can't get back to sleep within about 20 minutes, get out of bed and go to another room until you feel sleepy.  Avoid taking medicine right before bed that may keep you awake or make you feel hyper or energized. Your doctor can tell you if your medicine may do this and if you can take it earlier in the day.  If you can't sleep   Imagine yourself in a peaceful, pleasant scene. Focus on the details and feelings of being in a place that is relaxing.  Get up and do a quiet or boring activity until you feel sleepy.  Avoid drinking any liquids before going to bed to help prevent waking up often to use the " "bathroom.  Where can you learn more?  Go to https://www.Trippeo.net/patiented  Enter J942 in the search box to learn more about \"Learning About Sleeping Well.\"  Current as of: July 10, 2023  Content Version: 14.1 2006-2024 [employer].   Care instructions adapted under license by your healthcare professional. If you have questions about a medical condition or this instruction, always ask your healthcare professional. [employer] disclaims any warranty or liability for your use of this information.    Bladder Training: Care Instructions  Your Care Instructions     Bladder training is used to treat urge incontinence and stress incontinence. Urge incontinence means that the need to urinate comes on so fast that you can't get to a toilet in time. Stress incontinence means that you leak urine because of pressure on your bladder. For example, it may happen when you laugh, cough, or lift something heavy.  Bladder training can increase how long you can wait before you have to urinate. It can also help your bladder hold more urine. And it can give you better control over the urge to urinate.  It is important to remember that bladder training takes a few weeks to a few months to make a difference. You may not see results right away, but don't give up.  Follow-up care is a key part of your treatment and safety. Be sure to make and go to all appointments, and call your doctor if you are having problems. It's also a good idea to know your test results and keep a list of the medicines you take.  How can you care for yourself at home?  Work with your doctor to come up with a bladder training program that is right for you. You may use one or more of the following methods.  Delayed urination  In the beginning, try to keep from urinating for 5 minutes after you first feel the need to go.  While you wait, take deep, slow breaths to relax. Kegel exercises can also help you delay the need to go to the " "bathroom.  After some practice, when you can easily wait 5 minutes to urinate, try to wait 10 minutes before you urinate.  Slowly increase the waiting period until you are able to control when you have to urinate.  Scheduled urination  Empty your bladder when you first wake up in the morning.  Schedule times throughout the day when you will urinate.  Start by going to the bathroom every hour, even if you don't need to go.  Slowly increase the time between trips to the bathroom.  When you have found a schedule that works well for you, keep doing it.  If you wake up during the night and have to urinate, do it. Apply your schedule to waking hours only.  Kegel exercises  These tighten and strengthen pelvic muscles, which can help you control the flow of urine. (If doing these exercises causes pain, stop doing them and talk with your doctor.) To do Kegel exercises:  Squeeze your muscles as if you were trying not to pass gas. Or squeeze your muscles as if you were stopping the flow of urine. Your belly, legs, and buttocks shouldn't move.  Hold the squeeze for 3 seconds, then relax for 5 to 10 seconds.  Start with 3 seconds, then add 1 second each week until you are able to squeeze for 10 seconds.  Repeat the exercise 10 times a session. Do 3 to 8 sessions a day.  When should you call for help?  Watch closely for changes in your health, and be sure to contact your doctor if:    Your incontinence is getting worse.     You do not get better as expected.   Where can you learn more?  Go to https://www.[employer].net/patiented  Enter V684 in the search box to learn more about \"Bladder Training: Care Instructions.\"  Current as of: November 15, 2023               Content Version: 14.0    6378-7551 [employer], [employer].   Care instructions adapted under license by your healthcare professional. If you have questions about a medical condition or this instruction, always ask your healthcare professional. [employer], [employer] " disclaims any warranty or liability for your use of this information.      Learning About Depression Screening  What is depression screening?  Depression screening is a way to see if you have depression symptoms. It may be done by a doctor or counselor. It's often part of a routine checkup. That's because your mental health is just as important as your physical health.  Depression is a mental health condition that affects how you feel, think, and act. You may:  Have less energy.  Lose interest in your daily activities.  Feel sad and grouchy for a long time.  Depression is very common. It affects people of all ages.  Many things can lead to depression. Some people become depressed after they have a stroke or find out they have a major illness like cancer or heart disease. The death of a loved one or a breakup may lead to depression. It can run in families. Most experts believe that a combination of inherited genes and stressful life events can cause it.  What happens during screening?  You may be asked to fill out a form about your depression symptoms. You and the doctor will discuss your answers. The doctor may ask you more questions to learn more about how you think, act, and feel.  What happens after screening?  If you have symptoms of depression, your doctor will talk to you about your options.  Doctors usually treat depression with medicines or counseling. Often, combining the two works best. Many people don't get help because they think that they'll get over the depression on their own. But people with depression may not get better unless they get treatment.  The cause of depression is not well understood. There may be many factors involved. But if you have depression, it's not your fault.  A serious symptom of depression is thinking about death or suicide. If you or someone you care about talks about this or about feeling hopeless, get help right away.  It's important to know that depression can be treated.  "Medicine, counseling, and self-care may help.  Where can you learn more?  Go to https://www.healthScaleMP.net/patiented  Enter T185 in the search box to learn more about \"Learning About Depression Screening.\"  Current as of: June 24, 2023  Content Version: 14.1 2006-2024 "[employer].   Care instructions adapted under license by your healthcare professional. If you have questions about a medical condition or this instruction, always ask your healthcare professional. Healthwise, [employer] disclaims any warranty or liability for your use of this information.       "

## 2024-08-20 NOTE — ASSESSMENT & PLAN NOTE
Well-controlled on current regimen of metformin 1g in AM and 500 mg in PM.  A1c 5.8 2/2024  - Would continue same dose of metformin  - Eye exam normal 7/2024  - Foot exam normal today

## 2024-09-06 LAB — NONINV COLON CA DNA+OCC BLD SCRN STL QL: NORMAL

## 2024-09-12 ENCOUNTER — TELEPHONE (OUTPATIENT)
Dept: INTERNAL MEDICINE | Facility: CLINIC | Age: 64
End: 2024-09-12
Payer: COMMERCIAL

## 2024-09-12 NOTE — TELEPHONE ENCOUNTER
Reason for call:  Patient reporting a symptom    Symptom or request: Cough    Duration (how long have symptoms been present): more than 10 days and getting worse- hard to catch breath- declined speaking with triage at this time.    Have you been treated for this before? Yes    Additional comments: Patient would like prescription for Codeine sent to the pharmacy    Phone Number patient can be reached at:  Cell number on file:    Telephone Information:   Mobile [phone]       Best Time:  after 1 pm    Can we leave a detailed message on this number:  YES    Call taken on 9/12/2024 at 8:59 AM by Mabel Salinas

## 2024-09-12 NOTE — TELEPHONE ENCOUNTER
"Spoke with patient and relayed message.      Patient states \"I am not going to come to the clinic, what if I catch another sickness\". Writer let her know that she will not get any medications without being seen in person and that [facility] has no openings/is booked out. She was still refusing to be seen and wanted medication. She stated \"what if I have bronchitis?!\" Writer told her that she would still need to be seen and the provider would want to listen to her lungs/do additional tests if needed. Writer offered and assisted with looking at availability at other clinics but patient was not willing to go to further clinics like [place], [facility], [place], [place], etc. Writer did look into availability at [facility] but there was nothing until next week. (She states that she has polyarthralgia and cannot sit for long periods of time).    She wanted to know the first availability of PCP's schedule. Writer relayed March. Patient was not happy and states \"well surely someone there can see me!\" Writer relayed that all the providers her are booked out as well and there are no sooner openings but if she is not seen, we cannot do anything for her regarding this.    She then asked how long the wait time for walk in care is. Writer checked online and with  for wait time and relayed time of approx. 28 mins. Patient asked how [facility] works. Does she just come to the clinic?    Writer said yes, just come to the clinic. It is a first come first serve basis and the provider sees whoever checks in first.    Patient then went on saying \"ugh fine. I guess I will come in then.\"  "

## 2024-09-12 NOTE — CONFIDENTIAL NOTE
Patient called again to report she is now coughing up yellow tinge mucus. Please call if any questions. [phone], ok to leave a message.

## 2024-09-12 NOTE — TELEPHONE ENCOUNTER
Yes needs to be seen for cough, likely [facility]. Would not agree to controlled substance fill with telephone encounter.

## 2024-09-30 ENCOUNTER — PATIENT OUTREACH (OUTPATIENT)
Dept: GERIATRIC MEDICINE | Facility: CLINIC | Age: 64
End: 2024-09-30
Payer: COMMERCIAL

## 2024-10-01 NOTE — PROGRESS NOTES
[facility] Care Coordination Contact      [facility] Six-Month Telephone Assessment    6 month telephone assessment completed on 09/30/24.    ER visits: No  Hospitalizations: No  TCU stays: No  Significant health status changes: no  Falls/Injuries: No  ADL/IADL changes: No  Changes in services: No services in place    Caregiver Assessment follow up:  N/A    Goals: See Support Plan for goal progress documentation.      Will see member in 6 months for an annual health risk assessment.   Encouraged member to call CC with any questions or concerns in the meantime.     Mayra Dean RN  [facility]  [phone]

## 2024-10-22 ASSESSMENT — SLEEP AND FATIGUE QUESTIONNAIRES
HOW LIKELY ARE YOU TO NOD OFF OR FALL ASLEEP IN A CAR, WHILE STOPPED FOR A FEW MINUTES IN TRAFFIC: WOULD NEVER DOZE
HOW LIKELY ARE YOU TO NOD OFF OR FALL ASLEEP WHILE SITTING QUIETLY AFTER LUNCH WITHOUT ALCOHOL: SLIGHT CHANCE OF DOZING
HOW LIKELY ARE YOU TO NOD OFF OR FALL ASLEEP WHILE SITTING AND TALKING TO SOMEONE: WOULD NEVER DOZE
HOW LIKELY ARE YOU TO NOD OFF OR FALL ASLEEP WHEN YOU ARE A PASSENGER IN A CAR FOR AN HOUR WITHOUT A BREAK: WOULD NEVER DOZE
HOW LIKELY ARE YOU TO NOD OFF OR FALL ASLEEP WHILE WATCHING TV: WOULD NEVER DOZE
HOW LIKELY ARE YOU TO NOD OFF OR FALL ASLEEP WHILE SITTING AND READING: SLIGHT CHANCE OF DOZING
HOW LIKELY ARE YOU TO NOD OFF OR FALL ASLEEP WHILE LYING DOWN TO REST IN THE AFTERNOON WHEN CIRCUMSTANCES PERMIT: SLIGHT CHANCE OF DOZING
HOW LIKELY ARE YOU TO NOD OFF OR FALL ASLEEP WHILE SITTING INACTIVE IN A PUBLIC PLACE: SLIGHT CHANCE OF DOZING

## 2024-10-23 ENCOUNTER — VIRTUAL VISIT (OUTPATIENT)
Dept: SLEEP MEDICINE | Facility: CLINIC | Age: 64
End: 2024-10-23
Attending: INTERNAL MEDICINE
Payer: COMMERCIAL

## 2024-10-23 VITALS — HEIGHT: 66 IN | WEIGHT: 211 LBS | BODY MASS INDEX: 33.91 KG/M2

## 2024-10-23 DIAGNOSIS — G47.8 UNREFRESHED BY SLEEP: ICD-10-CM

## 2024-10-23 DIAGNOSIS — Z72.821 INADEQUATE SLEEP HYGIENE: ICD-10-CM

## 2024-10-23 DIAGNOSIS — G47.21 CIRCADIAN RHYTHM SLEEP DISORDER, DELAYED SLEEP PHASE TYPE: ICD-10-CM

## 2024-10-23 DIAGNOSIS — F32.A ANXIETY AND DEPRESSION: ICD-10-CM

## 2024-10-23 DIAGNOSIS — F51.04 CHRONIC INSOMNIA: Primary | ICD-10-CM

## 2024-10-23 DIAGNOSIS — G47.9 SLEEP DISTURBANCE: ICD-10-CM

## 2024-10-23 DIAGNOSIS — F41.9 ANXIETY AND DEPRESSION: ICD-10-CM

## 2024-10-23 DIAGNOSIS — G25.81 RESTLESS LEGS SYNDROME (RLS): ICD-10-CM

## 2024-10-23 DIAGNOSIS — G47.33 OSA (OBSTRUCTIVE SLEEP APNEA): ICD-10-CM

## 2024-10-23 DIAGNOSIS — R53.82 CHRONIC FATIGUE: ICD-10-CM

## 2024-10-23 DIAGNOSIS — R06.83 SNORING: ICD-10-CM

## 2024-10-23 DIAGNOSIS — I10 BENIGN ESSENTIAL HYPERTENSION: ICD-10-CM

## 2024-10-23 DIAGNOSIS — E66.811 OBESITY (BMI 30.0-34.9): ICD-10-CM

## 2024-10-23 PROCEDURE — 99204 OFFICE O/P NEW MOD 45 MIN: CPT | Mod: 95 | Performed by: INTERNAL MEDICINE

## 2024-10-23 ASSESSMENT — PAIN SCALES - GENERAL: PAINLEVEL_OUTOF10: NO PAIN (0)

## 2024-10-23 NOTE — PROGRESS NOTES
Virtual Visit Details    Type of service:  Video Visit   Video Start Time:  234pm  Video End Time: 313pm    Originating Location (pt. Location): Home    Distant Location (provider location):  Off-site  Platform used for Video Visit: [facility]      Outpatient Sleep Medicine Consultation:      Name: Mayra Wheeler MRN# [id number]   Age: 64 year old YOB: 1960     Date of Consultation: October 23, 2024  Consultation is requested by: Louise Rivera MD  [address] [place] [id number] Louise Rivera  Primary care provider: Louise Rivera       Reason for Sleep Consult:     Mayra Wheeler is sent by Louise Rivera for a sleep consultation regarding obtaining evaluation for obstructive sleep apnea.    Patient s Reason for visit  Mayra Wheeler main reason for visit: (Patient-Rptd) To rule out any sleep apnea issues that may be a contributing factor to my CFS DIAGNOSIS  Patient states problem(s) started: (Patient-Rptd) Several years ago.  Mayra Wheeler's goals for this visit: (Patient-Rptd) To hopefully get set up for a sleep study and subsequent medical device to use-if needed           Assessment and Plan:     Summary Sleep Diagnoses:  Previously diagnosed obstructive sleep apnea currently untreated.  Snoring, nonrestorative sleep and fatigue.  STOP-BANG score 4 out of 8  Chronic insomnia-multifactorial Delayed sleep phase/psychophysiological/anxiety, depression, possible untreated sleep apnea, PTSD(patient reported), schizoaffective disorder, RLS, inadequate sleep hygiene  RLS    Comorbid Diagnoses:  Chronic fatigue syndrome, Hyperlipidemia, Diabetes mellitus type 2, benign essential hypertension, schizoaffective disorder, depression, generalized anxiety disorder, Irritable bowel syndrome,B12 deficiency, nonallergic rhinitis, obesity    Summary Recommendations:  Previously diagnosed obstructive sleep apnea currently untreated. Patient reports  snoring, nonrestorative sleep and fatigue.  STOP-BANG score 4 out of 8  HST and PSG reviewed. Patient prefers HST. Orders generated for HST to evaluate for possible sleep apnea.    If insurance does not cover the HST, will obtain an in-lab sleep study and patient was agreeable with the plan.    Discussed pathophysiology and risks of untreated VONDA.  Information provided regarding treatment options for VONDA.  Patient was amenable to treatment using CPAP device   Patient will follow up 3 months after the sleep study. We will review results and initiate treatment (if indicated) over MyChart prior to the follow up.     Chronic insomnia-multifactorial Delayed sleep phase/psychophysiological/anxiety, depression, possible untreated sleep apnea, PTSD(patient reported), schizoaffective disorder, RLS, inadequate sleep hygiene  Stimulus control measures reviewed-information provided as summary  We discussed optimizing sleep hygiene measures including avoiding activities such as eating in bed, using phone/computer/tablet in bed   Cognitive behavioral therapy for insomnia (CBT-I) reviewed.  Referral to sleep psychologist Dr. Simón Elam provided to obtain CBT-I.  Patient was recommended to follow online CBT-I program while waiting for the appointment with the sleep psychologist.  Recommend continuing follow-up with psychiatry provider for optimizing the management of the anxiety, depression, PTSD (patient reported) and schizoaffective disorder.  We discussed all these conditions can be associated with insomnia.  We discussed regularizing sleep schedule. We arrived at a goal bedtime of 2 AM and a goal wake-up time of 9 AM and avoiding napping in the daytime.  Once she is able to arrive at a goal bedtime of 2 AM, she was instructed to gradually  work towards advancing the bedtime to 1 AM, so that she can get 7 to 8 hours of sleep per night. We also discussed minimizing exposure to too much of bright light for couple hours before  "the goal bedtime.  We also discussed avoiding mind stimulating activities including screen time before bed. Recommended purchasing bright lightbox of 10,000 Lux intensity and exposure to bright light soon after awakening for 30 to 60 minutes.      RLS: Recommend obtaining fasting blood work to evaluate for iron deficiency. She wants to follow-up with the primary care provider regarding getting the fasting blood work.  Non-pharmacological measures to control RLS symptoms were reviewed.  She is currently taking gabapentin for anxiety before bed and adjustments in the dosing of the gabapentin is an option to control the RLS symptoms.    She was instructed to follow-up with her psychiatry provider to discuss about adjusting dose of the gabapentin since she also reports that her anxiety is not adequately controlled.     We discussed weight management with diet and exercise.      Patient was strongly advised to avoid driving, operating any heavy machinery or other hazardous situations while drowsy or sleepy.  Patient was counseled on the importance of driving while alert, to pull over if drowsy, or nap before getting into the vehicle if sleepy.     Orders Placed This Encounter   Procedures    HST-Home Sleep Apnea Test - Noxturnal Returnable    Behavioral Sleep Medicine  Referral     Summary Counseling:    Sleep Testing Reviewed  Obstructive Sleep Apnea Reviewed  Complications of Untreated Sleep Apnea Reviewed  CBT-I, stimulus control measures, sleep hygiene Reviewed  RLS-nonpharmacological measures to control symptoms Reviewed    Medical Decision-making:   Educational materials provided in instructions      CC: Louise Heard MD    The above note was dictated using voice recognition software. Although reviewed after completion, some word and grammatical error may remain . Please contact the author for any clarifications.      \" Total time spent was 52 minutes for this appointment on this date of " "service which include time spent before, during and after the visit for chart review, patient care, counseling and coordination of care including documentation.\"      Arnie Mccrary MD  [facility]  [address] , [place] [address]          History of Present Illness:     Past Sleep Evaluations: 8 years ago PSG (weight at that time?252 lbs) sleep study obtained through [facility] in [place]-diagnosed with moderate VONDA, was prescribed CPAP.  She was using CPAP , but her cats kept chewing the tubing.  She also noticed that the device was not helping any more and discontinued the treatment after a long gap she restarted the CPAP therapy . Right around that time, she received notification that the CPAP device was recalled and she threw the device away.    She reports weight loss of 60 lbs since the last sleep study , but has gained  gained 20 lbs back.  Her primary care provider discussed with her the importance of getting the sleep apnea reevaluated since untreated sleep apnea could be contributing to the symptoms of the chronic fatigue and daytime sleepiness.    SLEEP-WAKE SCHEDULE:     Work/School Days: Patient goes to school/work: (Patient-Rptd) No   Usually gets into bed at (Patient-Rptd) 10:00 pm  Takes patient about (Patient-Rptd) 2 to 5 hours to fall asleep.   Has trouble falling asleep (Patient-Rptd) 7 nights per week. Anxiety affecting sleep.     Wakes up in the middle of the night (Patient-Rptd) 2 times.  Wakes up due to (Patient-Rptd) External stimuli (bed partner, pets, noise, etc);Anxiety;Nightmares  She has trouble falling back asleep (Patient-Rptd) 3-4 times a week.   It usually takes (Patient-Rptd) Anywhere from 20 minutes to 6 hours to get back to sleep  Patient is usually up at (Patient-Rptd) I dont feel awake until noon or 1:00 pm. But i am up earlier for breakfast, and feeding my cats.  Uses alarm: (Patient-Rptd) " "No    Weekends/Non-work Days/All Other Days:  Usually gets into bed at (Patient-Rptd) 10:00 pm   Takes patient about (Patient-Rptd) Usually less than during the weekdays but still can go up to 3 hours to fall asleep  Patient is usually up at (Patient-Rptd) Same as weekdays  Uses alarm: (Patient-Rptd) No    She reported \"Typically asleep by 4am , wake up at 9 AM to feed cats and goes back to sleep until noon-1pm. Groggy till 4PM. Then she gets some energy-gets things done around the house till 7/8PM\"     Sleep Need  Patient gets  (Patient-Rptd) 8-11 sleep on average   Patient thinks she needs about (Patient-Rptd) 11 hours sleep    Mayra Wheeler prefers to sleep in this position(s): (Patient-Rptd) Back;Side   Patient states they do the following activities in bed: (Patient-Rptd) Eat;Use phone, computer, or tablet    Naps  Patient takes a purposeful nap   times a week and naps are usually (Patient-Rptd) I rarely nap unless i get up at 10am or before than i have to go back to sleep until 12:00pm in duration  She feels better after a nap: (Patient-Rptd) Yes  She dozes off unintentionally (Patient-Rptd) 0 days per week  Patient has had a driving accident or near-miss due to sleepiness/drowsiness: (Patient-Rptd) No      SLEEP DISRUPTIONS:    Breathing/Snoring  Patient snores:(Patient-Rptd) Yes  Other people complain about her snoring: (Patient-Rptd) Yes  Patient has been told she stops breathing in her sleep:(Patient-Rptd) No (sleeps alone)  She has issues with the following: (Patient-Rptd) Morning mouth dryness    Movement:  She reports symptoms of RLS 3/7 nights  Legs won't settle down-describes it as jumpiness  Symptoms start around 11pm   Patient gets pain, discomfort, with an urge to move:  (Patient-Rptd) Yes  It happens when she is resting:  (Patient-Rptd) Yes  It happens more at night:  (Patient-Rptd) No  Patient has been told she kicks her legs at night:  (Patient-Rptd) Yes     Behaviors in Sleep:  Mayra GIVENS" Liam has experienced the following behaviors while sleeping: (Patient-Rptd) Recurring Nightmares related to PTSD.  There are no reports of sleepwalking, sleep talking, sleep eating or dream enactment behavior.    She has experienced sudden muscle weakness during the day:  No      Is there anything else you would like your sleep provider to know: (Patient-Rptd) It has always felt better to sleep if I walk 2 miles during the day but cant do that much anymore because of the CFS      CAFFEINE AND OTHER SUBSTANCES:    Patient consumes caffeinated beverages per day:  (Patient-Rptd) None  Last caffeine use is usually: (Patient-Rptd) I dont use caffiene  List of any prescribed or over the counter stimulants that patient takes: (Patient-Rptd) None  List of any prescribed or over the counter sleep medication patient takes: (Patient-Rptd) None  List of previous sleep medications that patient has tried: (Patient-Rptd) Just one that was given to me at my last sleep study because i cannot fall asleep there. Ambien one time for last sleep study.  Patient drinks alcohol to help them sleep: (Patient-Rptd) No  Patient drinks alcohol near bedtime: (Patient-Rptd) No    Family History:  Patient has a family member been diagnosed with a sleep disorder: (Patient-Rptd) No            SCALES:    EPWORTH SLEEPINESS SCALE         10/22/2024     9:14 PM    [place] Sleepiness Scale ( CATA Smith  [id number]<br>ESS - [place]/English - Final version - 21 Nov 07 - [facility].)   Sitting and reading Slight chance of dozing   Watching TV Would never doze   Sitting, inactive in a public place (e.g. a theatre or a meeting) Slight chance of dozing   As a passenger in a car for an hour without a break Would never doze   Lying down to rest in the afternoon when circumstances permit Slight chance of dozing   Sitting and talking to someone Would never doze   Sitting quietly after a lunch without alcohol Slight chance of dozing   In a car, while  "stopped for a few minutes in traffic Would never doze   [place] Score (MC) 4   [place] Score (Sleep) 4         INSOMNIA SEVERITY INDEX (ELIESER)          10/22/2024     8:50 PM   Insomnia Severity Index (ELIESER)   Difficulty falling asleep 3    Difficulty staying asleep 2    Problems waking up too early 2    How SATISFIED/DISSATISFIED are you with your CURRENT sleep pattern? 3    How NOTICEABLE to others do you think your sleep problem is in terms of impairing the quality of your life? 4    How WORRIED/DISTRESSED are you about your current sleep problem? 3    To what extent do you consider your sleep problem to INTERFERE with your daily functioning (e.g. daytime fatigue, mood, ability to function at work/daily chores, concentration, memory, mood, etc.) CURRENTLY? 4    ELIESER Total Score 21       Patient-reported       Guidelines for Scoring/Interpretation:  Total score categories:  0-7 = No clinically significant insomnia   8-14 = Subthreshold insomnia   15-21 = Clinical insomnia (moderate severity)  22-28 = Clinical insomnia (severe)  Used via courtesy of www.[employer].va.gov with permission from Grzegorz Kaiser PhD., [facility]      STOP BANG 4/8        10/23/2024     2:01 PM   STOP BANG Questionnaire (  2008, the American Society of Anesthesiologists, Inc. [employer]   B/P Clinic: --   BMI Clinic: 34.06         GAD7         No data to display                  CAGE-AID         No data to display                CAGE-AID reprinted with permission from the [place] Medical Journal, DELMIS Barrera. and SAVANNA Vanegas, \"Conjoint screening questionnaires for alcohol and drug abuse\" [place] Medical Journal 94: 135-140, 1995.      PATIENT HEALTH QUESTIONNAIRE-9 (PHQ - 9)        8/19/2024     4:04 PM   PHQ-9 ([employer])   1.  Little interest or pleasure in doing things 0    2.  Feeling down, depressed, or hopeless 0    3.  Trouble falling or staying asleep, or sleeping too much 1    4.  Feeling tired or " having little energy 3    5.  Poor appetite or overeating 1    6.  Feeling bad about yourself - or that you are a failure or have let yourself or your family down 0    7.  Trouble concentrating on things, such as reading the newspaper or watching television 0    8.  Moving or speaking so slowly that other people could have noticed. Or the opposite - being so fidgety or restless that you have been moving around a lot more than usual 0    9.  Thoughts that you would be better off dead, or of hurting yourself in some way 0    PHQ-9 Total Score 5   6.  Feeling bad about yourself 0    7.  Trouble concentrating 0    8.  Moving slowly or restless 0    9.  Suicidal or self-harm thoughts 0    1.  Little interest or pleasure in doing things Not at all   2.  Feeling down, depressed, or hopeless Not at all   3.  Trouble falling or staying asleep, or sleeping too much Several days   4.  Feeling tired or having little energy Nearly every day   5.  Poor appetite or overeating Several days   6.  Feeling bad about yourself Not at all   7.  Trouble concentrating Not at all   8.  Moving slowly or restless Not at all   9.  Suicidal or self-harm thoughts Not at all   PHQ-9 via [email or web address] TOTAL SCORE-----> 5 (Mild depression)   Difficulty at work, home, or with people Not difficult at all       Patient-reported       Developed by Farzad Albert, Chelsea Ackerman, Matthias Bhakta and colleagues, with an educational sergey from [employer]. No permission required to reproduce, translate, display or distribute.        Allergies:    Allergies   Allergen Reactions    Naltrexone Anxiety    Protonix [Pantoprazole] Nausea and Vomiting    Abilify [Aripiprazole] Other (See Comments)     seizure    Albuterol Other (See Comments)     shakey    Geodon [Ziprasidone] Other (See Comments)     psychosis    Saphris [Asenapine] Unknown    Demerol [Meperidine] Other (See Comments)     lightheaded    Naproxen Palpitations    Omeprazole Palpitations      Palpitations with dosage above 10 mg    Septra [Sulfamethoxazole-Trimethoprim] Unknown     Possibly caused a UTI?       Medications:    Current Outpatient Medications   Medication Sig Dispense Refill    albuterol (PROVENTIL HFA;VENTOLIN HFA) 90 mcg/actuation inhaler [ALBUTEROL (PROVENTIL HFA;VENTOLIN HFA) 90 MCG/ACTUATION INHALER] Inhale 2 puffs 4 (four) times a day as needed for wheezing or shortness of breath.      Azelastine HCl 137 MCG/SPRAY SOLN 1 PUFF INTO EACH NOSTRIL TWICE A DAY      Blood Glucose Monitoring Suppl (ONETOUCH VERIO FLEX SYSTEM) w/Device KIT See Admin Instructions      cholecalciferol (VITAMIN D3) 125 mcg (5000 units) capsule       Cyanocobalamin (B-12) 1000 MCG TBCR Take 1,000 mcg by mouth daily 90 tablet 3    gabapentin (NEURONTIN) 300 MG capsule [GABAPENTIN (NEURONTIN) 300 MG CAPSULE] Take 600 mg by mouth bedtime.       Lancets (ONETOUCH DELICA PLUS BTLLSZ93H) MISC USE TO TEST 4 TIMES DAILY AS DIRECTED 90      losartan (COZAAR) 50 MG tablet Take 1 tablet (50 mg) by mouth daily 90 tablet 0    meclizine (ANTIVERT) 25 MG tablet Take 1 tablet (25 mg) by mouth 3 times daily as needed for dizziness 30 tablet 1    metFORMIN (GLUCOPHAGE) 500 MG tablet Take 500 mg by mouth daily (with breakfast) 2 tabs in AM, 1 at nighttime.  0    ONETOUCH VERIO IQ test strip USE 1 STRIP TWICE DAILY      perphenazine 8 MG tablet [PERPHENAZINE 8 MG TABLET] Take 8 mg by mouth at bedtime.             pravastatin (PRAVACHOL) 40 MG tablet [PRAVASTATIN (PRAVACHOL) 40 MG TABLET] Take 40 mg by mouth at bedtime.             sertraline (ZOLOFT) 100 MG tablet [SERTRALINE (ZOLOFT) 100 MG TABLET] Take 200 mg by mouth daily.       traZODone (DESYREL) 100 MG tablet [TRAZODONE (DESYREL) 100 MG TABLET] Take 100 mg by mouth bedtime.      vitamin C (ASCORBIC ACID) 500 MG tablet Take 500 mg by mouth daily         Problem List:  Patient Active Problem List    Diagnosis Date Noted    Encounter for Medicare annual wellness exam 08/20/2024      Priority: Medium    Chronic fatigue 2024     Priority: Medium    Non-allergic rhinitis 2024     Priority: Medium    Vitamin B12 deficiency (non anemic) 2024     Priority: Medium    VONDA (obstructive sleep apnea) 2024     Priority: Medium    Vertigo 2024     Priority: Medium    Anxiety 2021     Priority: Medium    Benign essential hypertension 2021     Priority: Medium    Status post hysterectomy 2021     Priority: Medium    History of nephrolithiasis 2019     Priority: Medium    Near syncope 2019     Priority: Medium    Diabetes mellitus, type 2 (H) 2019     Priority: Medium    Hyperlipidemia      Priority: Medium    Depression      Priority: Medium    Schizoaffective disorder (H)      Priority: Medium    IBS (irritable bowel syndrome)      Priority: Medium    Asthma 2018     Priority: Medium    Fatty liver      Priority: Medium    Polyarthralgia 2018     Priority: Medium    Bilateral primary osteoarthritis of knee 2018     Priority: Medium        Past Medical/Surgical History:  Past Medical History:   Diagnosis Date    Arthritis     Depressive disorder 1988    Diabetes (H)     Hypertension     Uncomplicated asthma      Past Surgical History:   Procedure Laterality Date     SECTION       SECTION      1 time    COLONOSCOPY      COMBINED CYSTOSCOPY, INSERT STENT URETER(S) Right 2016    Procedure: CYSTOSCOPY, RIGHT URETEROSCOPY, HOLMIUM LASER LITHOTRIPSY AND RIGHT STENT PLACEMENT;  Surgeon: Keven Holland MD;  Location: [facility];  Service:     HYSTERECTOMY  1992    TUBAL LIGATION         Social History:  Social History     Socioeconomic History    Marital status:      Spouse name: Not on file    Number of children: Not on file    Years of education: Not on file    Highest education level: Not on file   Occupational History    Not on file   Tobacco Use    Smoking status: Former      Current packs/day: 0.00     Types: Cigarettes     Quit date: 1997     Years since quittin.8    Smokeless tobacco: Never    Tobacco comments:     Quit 15-20 years ago   Substance and Sexual Activity    Alcohol use: No    Drug use: No    Sexual activity: Not Currently     Birth control/protection: Surgical     Comment: partial hysterectomy   Other Topics Concern    Parent/sibling w/ CABG, MI or angioplasty before 65F 55M? No   Social History Narrative    Not on file     Social Drivers of Health     Financial Resource Strain: Low Risk  (2024)    Financial Resource Strain     Within the past 12 months, have you or your family members you live with been unable to get utilities (heat, electricity) when it was really needed?: No   Food Insecurity: Low Risk  (2024)    Food Insecurity     Within the past 12 months, did you worry that your food would run out before you got money to buy more?: No     Within the past 12 months, did the food you bought just not last and you didn t have money to get more?: No   Transportation Needs: Low Risk  (2024)    Transportation Needs     Within the past 12 months, has lack of transportation kept you from medical appointments, getting your medicines, non-medical meetings or appointments, work, or from getting things that you need?: No   Physical Activity: Insufficiently Active (2024)    Exercise Vital Sign     Days of Exercise per Week: 3 days     Minutes of Exercise per Session: 40 min   Stress: Stress Concern Present (2024)    [place] [employer] - Occupational Stress Questionnaire     Feeling of Stress : To some extent   Social Connections: Unknown (2024)    Social Connection and Isolation Panel [NHANES]     Frequency of Communication with Friends and Family: Not on file     Frequency of Social Gatherings with Friends and Family: Once a week     Attends Gnosticism Services: Not on file     Active Member of Clubs or Organizations:  Not on file     Attends Club or Organization Meetings: Not on file     Marital Status: Not on file   Interpersonal Safety: Low Risk  (8/20/2024)    Interpersonal Safety     Do you feel physically and emotionally safe where you currently live?: Yes     Within the past 12 months, have you been hit, slapped, kicked or otherwise physically hurt by someone?: No     Within the past 12 months, have you been humiliated or emotionally abused in other ways by your partner or ex-partner?: No   Housing Stability: Low Risk  (8/19/2024)    Housing Stability     Do you have housing? : Yes     Are you worried about losing your housing?: No       Family History:  Family History   Problem Relation Age of Onset    Multiple Sclerosis Mother     Cancer Father     Hypertension Father     Diabetes Father     Cancer Paternal Grandfather     Obesity Brother     Diabetes Brother     Hyperlipidemia Brother     Hypertension Brother        Review of Systems:  A complete review of systems reviewed by me is negative with the exeption of what has been mentioned in the history of present illness.  In the last TWO WEEKS have you experienced any of the following symptoms?  Fevers: (Patient-Rptd) No  Night Sweats: (Patient-Rptd) Yes  Weight Gain: (Patient-Rptd) No  Pain at Night: (Patient-Rptd) No  Double Vision: (Patient-Rptd) No  Changes in Vision: (Patient-Rptd) No  Difficulty Breathing through Nose: (Patient-Rptd) Yes  Sore Throat in Morning: (Patient-Rptd) Yes  Dry Mouth in the Morning: (Patient-Rptd) Yes  Shortness of Breath Lying Flat: (Patient-Rptd) Yes  Shortness of Breath With Activity: (Patient-Rptd) No  Awakening with Shortness of Breath: (Patient-Rptd) No  Increased Cough: (Patient-Rptd) No  Heart Racing at Night: (Patient-Rptd) No  Diarrhea at Night: (Patient-Rptd) No  Heartburn at Night: (Patient-Rptd) No  Urinating More than Once at Night: (Patient-Rptd) No  Losing Control of Urine at Night: (Patient-Rptd) No  Joint Pains at Night:  "(Patient-Rptd) No  Headaches in Morning: (Patient-Rptd) Yes  Weakness in Arms or Legs: (Patient-Rptd) Yes  Depressed Mood: (Patient-Rptd) No  Anxiety: (Patient-Rptd) Yes she has been following up with CHER Alarcon at [facility] in [place]. Psychiatry NP ([facility])     Physical Examination:  Vitals: Ht 1.676 m (5' 6\")   Wt 95.7 kg (211 lb)   BMI 34.06 kg/m    BMI= Body mass index is 34.06 kg/m .  General: No apparent distress, appropriately groomed  Head: Normocephalic, atraumatic  Neck:Circumference: 14 inches  Chest: No cough, no audible wheezing, able to talk in full sentences.   Psych: coherent speech, normal rate and volume, able to articulate logical thoughts, able   to abstract reason, no tangential thoughts, no hallucinations   or delusions  Her affect is normal  Neuro:  Mental status: Alert and  Oriented X 3  Speech: normal            Data: All pertinent previous laboratory data reviewed     Recent Labs   Lab Test 06/14/24  1418 09/05/23  1442    142   POTASSIUM 5.0 4.5   CHLORIDE 106 103   CO2 26 25   ANIONGAP 11 14   * 111*   BUN 11.5 10.0   CR 0.71 0.66   SANDY 9.6 10.0       Recent Labs   Lab Test 06/14/24  1418   WBC 5.5   RBC 4.53   HGB 13.4   HCT 38.7   MCV 85   MCH 29.6   MCHC 34.6   RDW 12.4          Recent Labs   Lab Test 06/14/24  1418   PROTTOTAL 7.5   ALBUMIN 4.4   BILITOTAL 0.4   ALKPHOS 80   AST 32   ALT 39       TSH (uIU/mL)   Date Value   09/05/2023 2.10   04/19/2022 2.18   09/11/2020 1.32       No results found for: \"UAMP\", \"UBARB\", \"BENZODIAZEUR\", \"UCANN\", \"UCOC\", \"OPIT\", \"UPCP\"    No results found for: \"IRONSAT\", \"[id number]\", \"KELLY\"    No results found for: \"PH\", \"PHARTERIAL\", \"PO2\", \"AW5RZLSGEKD\", \"SAT\", \"PCO2\", \"HCO3\", \"BASEEXCESS\", \"JENNIFER\", \"BEB\"     Echocardiography: No results found for this or any previous visit (from the past 4320 hours).    Chest x-ray: No results found for this or any previous visit from the past 365 days.      Chest CT: " No results found for this or any previous visit from the past 365 days.      PFT: Most Recent [facility] Pulmonary Function Testing: No results found      Arnie Mccrary MD 10/23/2024

## 2024-10-23 NOTE — NURSING NOTE
Current patient location: [address]  [place] [facility] [address]    Is the patient currently in the state of [place]? YES    Visit mode:VIDEO    If the visit is dropped, the patient can be reconnected by: VIDEO VISIT: Text to cell phone:   Telephone Information:   Mobile [phone]       Will anyone else be joining the visit? NO  (If patient encounters technical issues they should call [phone]    Are changes needed to the allergy or medication list? No    Are refills needed on medications prescribed by this physician? NO    Rooming Documentation:  Questionnaire(s) completed    Reason for visit: Consult    Gary CLAROSF

## 2024-10-24 NOTE — PATIENT INSTRUCTIONS
Summary recommendations:  Sleep apnea: Our clinic staff will contact you to schedule a home sleep study to evaluate for sleep apnea .    We will review results and initiate treatment (if indicated) over [facility] prior to the follow up which will be  approximately 3 months after the sleep study.   Please review information provided below regarding the different treatment options available for sleep apnea.    Chronic insomnia: Please review information provided below regarding tips for insomnia -stimulus control measures.  We discussed optimizing sleep hygiene measures including avoiding activities such as eating in bed, using phone/computer/tablet in bed   Cognitive behavioral therapy for insomnia (CBT-I) is the gold standard treatment for insomnia. Referral to sleep psychologist Dr. Simón Elam has been provided to obtain CBT-I.  Recommend reviewing the information provided below regarding online CBT-I and follow the online CBT-I program while waiting for the appointment with the sleep psychologist.  Recommend continuing follow-up with psychiatry provider for optimizing the management of the anxiety, depression, PTSD (patient reported) and schizoaffective disorder.  We discussed all these conditions can be associated with insomnia.  We discussed regularizing sleep schedule. We arrived at a goal bedtime of 2 AM and a goal wake-up time of 9 AM and avoiding napping in the daytime.  Once you are able to arrive at a goal bedtime of 2 AM, you may gradually work towards advancing the bedtime to 1 AM, so that you can get 7 to 8 hours of sleep per night. We also discussed minimizing exposure to too much of bright light for couple hours before the goal bedtime.  We also discussed avoiding mind stimulating activities and avoiding screen time before bed. Recommended purchasing bright lightbox of 10,000 Lux intensity through online resources and exposure to bright light soon after awakening at 9 AM  for 30 to 60 minutes.   "    Restless leg symptoms(RLS): Iron deficiency can be associated with RLS.  You were recommended obtaining fasting blood work to evaluate for iron deficiency.  You wanted to discuss it with your primary care provider, and please follow-up with the PCP.  Please review information provided regarding some tips to control RLS symptoms.  You are currently taking gabapentin for anxiety before bed and adjustments in the dosing of the gabapentin under physician supervision is an option to control the RLS symptoms.    Recommend follow-up with your psychiatry provider to discuss about adjusting dose of the gabapentin since you also reports that anxiety is not adequately controlled.    Please follow healthy diet and exercise as tolerated.    Please avoid driving, operating any heavy machinery or other hazardous situations while drowsy or sleepy.                     MY TREATMENT INFORMATION FOR SLEEP APNEA-  Mayra Wheeler          Am I having a home sleep study?  --->Watch the video for the device you are using:    -/drop off device-   https://www.GROUNDFLOOR.com/watch?v=yGGFBdELGhk      Frequently asked questions:  1. What is Obstructive Sleep Apnea (VONDA)? VONDA is the most common type of sleep apnea. Apnea means, \"without breath.\"  Apnea is most often caused by narrowing or collapse of the upper airway as muscles relax during sleep.   Almost everyone has occasional apneas. Most people with sleep apnea have had brief interruptions at night frequently for many years.  The severity of sleep apnea is related to how frequent and severe the events are.   2. What are the consequences of VONDA? Symptoms include: feeling sleepy during the day, snoring loudly, gasping or stopping of breathing, trouble sleeping, and occasionally morning headaches or heartburn at night.  Sleepiness can be serious and even increase the risk of falling asleep while driving. Other health consequences may include development of high blood pressure and " other cardiovascular disease in persons who are susceptible. Untreated VONDA  can contribute to heart disease, stroke and diabetes.   3. What are the treatment options? In most situations, sleep apnea is a lifelong disease that must be managed with daily therapy. Medications are not effective for sleep apnea and surgery is generally not considered until other therapies have been tried. Your treatment is your choice . Continuous Positive Airway (CPAP) works right away and is the therapy that is effective in nearly everyone. An oral device to hold your jaw forward is usually the next most reliable option. Other options include postioning devices (to keep you off your back), weight loss, and surgery including a tongue pacing device. There is more detail about some of these options below.  4. Are my sleep studies covered by insurance? Although we will request verification of coverage, we advise you also check in advance of the study to ensure there is coverage.    Important tips for those choosing CPAP and similar devices  REMEMBER-IF YOU RECEIVE A CALL FROM  [phone]-->IT IS TO SETUP A DEVICE  For new devices, sign up for device CIARAN to monitor your device for your followup visits  We encourage you to utilize the [employer] ciaran or website ( https://idiag.[email or web address] ) to monitor your therapy progress and share the data with your healthcare team when you discuss your sleep apnea.                                                    Know your equipment:  CPAP is continuous positive airway pressure that prevents obstructive sleep apnea by keeping the throat from collapsing while you are sleeping. In most cases, the device is  smart  and can slowly self-adjusts if your throat collapses and keeps a record every day of how well you are treated-this information is available to you and your care team.  BPAP is bilevel positive airway pressure that keeps your throat open and also assists each breath with a pressure boost to  maintain adequate breathing.  Special kinds of BPAP are used in patients who have inadequate breathing from lung or heart disease. In most cases, the device is  smart  and can slowly self-adjusts to assist breathing. Like CPAP, the device keeps a record of how well you are treated.  Your mask is your connection to the device. You get to choose what feels most comfortable and the staff will help to make sure if fits. Here: are some examples of the different masks that are available: Magnetic mask aids may assist with use but there are safety issues that should be addressed when considering with magnets* ( see end of discussion).       Key points to remember on your journey with sleep apnea:  Sleep study.  PAP devices often need to be adjusted during a sleep study to show that they are effective and adjusted right.  Good tips to remember: Try wearing just the mask during a quiet time during the day so your body adapts to wearing it. A humidifier is recommended for comfort in most cases to prevent drying of your nose and throat. Allergy medication from your provider may help you if you are having nasal congestion.  Getting settled-in. It takes more than one night for most of us to get used to wearing a mask. Try wearing just the mask during a quiet time during the day so your body adapts to wearing it. A humidifier is recommended for comfort in most cases. Our team will work with you carefully on the first day and will be in contact within 4 days and again at 2 and 4 weeks for advice and remote device adjustments. Your therapy is evaluated by the device each day.   Use it every night. The more you are able to sleep naturally for 7-8 hours, the more likely you will have good sleep and to prevent health risks or symptoms from sleep apnea. Even if you use it 4 hours it helps. Occasionally all of us are unable to use a medical therapy, in sleep apnea, it is not dangerous to miss one night.   Communicate. Call our skilled  team on the number provided on the first day if your visit for problems that make it difficult to wear the device. Over 2 out of 3 patients can learn to wear the device long-term with help from our team. Remember to call our team or your sleep providers if you are unable to wear the device as we may have other solutions for those who cannot adapt to mask CPAP therapy. It is recommended that you sleep your sleep provider within the first 3 months and yearly after that if you are not having problems.   Use it for your health. We encourage use of CPAP masks during daytime quiet periods to allow your face and brain to adapt to the sensation of CPAP so that it will be a more natural sensation to awaken to at night or during naps. This can be very useful during the first few weeks or months of adapting to CPAP though it does not help medically to wear CPAP during wakefulness and  should not be used as a strategy just to meet guidelines.  Take care of your equipment. Make sure you clean your mask and tubing using directions every day and that your filter and mask are replaced as recommended or if they are not working.     *Masks with magnets:  Updated Contraindications  Masks with magnetic components are contraindicated for use by patients where they, or anyone in close physical contact while using the mask, have the following:   Active medical implants that interact with magnets (i.e., pacemakers, implantable cardioverter defibrillators (ICD), neurostimulators, cerebrospinal fluid (CSF) shunts, insulin/infusion pumps)   Metallic implants/objects containing ferromagnetic material (i.e., aneurysm clips/flow disruption devices, embolic coils, stents, valves, electrodes, implants to restore hearing or balance with implanted magnets, ocular implants, metallic splinters in the eye)  Updated Warning  Keep the mask magnets at a safe distance of at least 6 inches (150 mm) away from implants or medical devices that may be adversely  affected by magnetic interference. This warning applies to you or anyone in close physical contact with your mask. The magnets are in the frame and lower headgear clips, with a magnetic field strength of up to 400mT. When worn, they connect to secure the mask but may inadvertently detach while asleep.  Implants/medical devices, including those listed within contraindications, may be adversely affected if they change function under external magnetic fields or contain ferromagnetic materials that attract/repel to magnetic fields (some metallic implants, e.g., contact lenses with metal, dental implants, metallic cranial plates, screws, darrel hole covers, and bone substitute devices). Consult your physician and  of your implant / other medical device for information on the potential adverse effects of magnetic fields.    BESIDES CPAP, WHAT OTHER THERAPIES ARE THERE?    Positioning Device  Positioning devices are generally used when sleep apnea is mild and only occurs on your back.This example shows a pillow that straps around the waist. It may be appropriate for those whose sleep study shows milder sleep apnea that occurs primarily when lying flat on one's back. Preliminary studies have shown benefit but effectiveness at home may need to be verified by a home sleep test. These devices are generally not covered by medical insurance.  Examples of devices that maintain sleeping on the back to prevent snoring and mild sleep apnea.    Belt type body positioner  http://iCabbi/    Electronic reminder  http://nightshifttherapy.com/            Oral Appliance  What is oral appliance therapy?  An oral appliance device fits on your teeth at night like a retainer used after having braces. The device is made by a specialized dentist and requires several visits over 1-2 months before a manufactured device is made to fit your teeth and is adjusted to prevent your sleep apnea. Once an oral device is working properly,  snoring should be improved. A home sleep test may be recommended at that time if to determine whether the sleep apnea is adequately treated.       Some things to remember:  -Oral devices are often, but not always, covered by your medical insurance. Be sure to check with your insurance provider.   -If you are referred for oral therapy, you will be given a list of specialized dentists to consider or you may choose to visit the Web site of the American Academy of Dental Sleep Medicine  -Oral devices are less likely to work if you have severe sleep apnea or are extremely overweight.     More detailed information  An oral appliance is a small acrylic device that fits over the upper and lower teeth  (similar to a retainer or a mouth guard). This device slightly moves jaw forward, which moves the base of the tongue forward, opens the airway, improves breathing for effective treat snoring and obstructive sleep apnea in perhaps 7 out of 10 people .  The best working devices are custom-made by a dental device  after a mold is made of the teeth 1, 2, 3.  When is an oral appliance indicated?  Oral appliance therapy is recommended as a first-line treatment for patients with primary snoring, mild sleep apnea, and for patients with moderate sleep apnea who prefer appliance therapy to use of CPAP4, 5. Severity of sleep apnea is determined by sleep testing and is based on the number of respiratory events per hour of sleep.   How successful is oral appliance therapy?  The success rate of oral appliance therapy in patients with mild sleep apnea is 75-80% while in patients with moderate sleep apnea it is 50-70%. The chance of success in patients with severe sleep apnea is 40-50%. The research also shows that oral appliances have a beneficial effect on the cardiovascular health of VONDA patients at the same magnitude as CPAP therapy7.  Oral appliances should be a second-line treatment in cases of severe sleep apnea, but if not  completely successful then a combination therapy utilizing CPAP plus oral appliance therapy may be effective. Oral appliances tend to be effective in a broad range of patients although studies show that the patients who have the highest success are females, younger patients, those with milder disease, and less severe obesity. 3, 6.   Finding a dentist that practices dental sleep medicine  Specific training is available through the American Academy of Dental Sleep Medicine for dentists interested in working in the field of sleep. To find a dentist who is educated in the field of sleep and the use of oral appliances, near you, visit the Web site of the American Academy of Dental Sleep Medicine.    References  1. Erlin et al. Objectively measured vs self-reported compliance during oral appliance therapy for sleep-disordered breathing. Chest 2013; 144(5): 0681-9260.  2. Anya et al. Objective measurement of compliance during oral appliance therapy for sleep-disordered breathing. Thorax 2013; 68(1): 91-96.  3. Elle, et al. Mandibular advancement devices in 620 men and women with VONDA and snoring: tolerability and predictors of treatment success. Chest 2004; 125: 9693-2609.  4. Susana, et al. Oral appliances for snoring and VONDA: a review. Sleep 2006; 29: 244-262.  5. Daniel et al. Oral appliance treatment for VONDA: an update. J Clin Sleep Med 2014; 10(2): 215-227.  6. Urban et al. Predictors of OSAH treatment outcome. J Dent Res 2007; 86: 0897-2004.      Weight Loss:   Your Body mass index is 34.06 kg/m .    Being overweight does not necessarily mean you will have health consequences.  Those who have BMI over 35 or over 27 with existing medical conditions carries greater risk.   Weight loss decreases severity of sleep apnea in most people with obesity. For those with mild obesity who have developed snoring with weight gain, even 15-30 pound weight loss can improve and occasionally milder eliminate  sleep apnea.  Structured and life-long dietary and health habits are necessary to lose weight and keep healthier weight levels.     The Comprehensive Weight loss program offers all aspects of weight loss strategies including two Non-Surgical Weight Loss Programs: Medical Weight Management and our 24 Week Healthy Lifestyle Program:    Medical Weight Management: You will meet with a Medical Weight Management Provider, as well as a Registered Dietician. The program may include medication therapy, dietary education, recommended exercise and physical therapy programs, monthly support group meetings, and possible psychological counseling. Follow up visits with the provider or dietician are scheduled based on your progress and needs.    24 Week Healthy Lifestyle Program: This unique program is designed to give you the support of weekly appointments and activities thru a 24-week period. It may include all of the components of the basic program (above), with the addition of 11 individual Health  Visits, 24-week access to the [employer] website for over 700 online classes, and monthly support group meetings. This program has an out-of-pocket expense of $499 to cover the items that can not be billed to insurance (health coaches and [facility] access), and is non-refundable/non-transferable (you may be able to use a Health Savings Account; ask your HSA provider). There may be an optional meal replacement plan prescribed as well.   Surgical management achieves meaningful long-term weight loss and improvement in health risks in most patients with more severe obesity.      Sleep Apnea Surgery:    Surgery for obstructive sleep apnea is considered generally only when other therapies fail to work. Surgery may be discussed with you if you are having a difficult time tolerating CPAP and or when there is an abnormal structure that requires surgical correction.  Nose and throat surgeries often enlarge the airway to prevent collapse.   Most of these surgeries create pain for 1-2 weeks and up to half of the most common surgeries are not effective throughout life.  You should carefully discuss the benefits and drawbacks to surgery with your sleep provider and surgeon to determine if it is the best solution for you.   More information  Surgery for VONDA is directed at areas that are responsible for narrowing or complete obstruction of the airway during sleep.  There are a wide range of procedures available to enlarge and/or stabilize the airway to prevent blockage of breathing in the three major areas where it can occur: the palate, tongue, and nasal regions.  Successful surgical treatment depends on the accurate identification of the factors responsible for obstructive sleep apnea in each person.  A personalized approach is required because there is no single treatment that works well for everyone.  Because of anatomic variation, consultation with an examination by a sleep surgeon is a critical first step in determining what surgical options are best for each patient.  In some cases, examination during sedation may be recommended in order to guide the selection of procedures.  Patients will be counseled about risks and benefits as well as the typical recovery course after surgery. Surgery is typically not a cure for a person s VONDA.  However, surgery will often significantly improve one s VONDA severity (termed  success rate ).  Even in the absence of a cure, surgery will decrease the cardiovascular risk associated with OSA7; improve overall quality of life8 (sleepiness, functionality, sleep quality, etc).      Palate Procedures:  Patients with VONDA often have narrowing of their airway in the region of their tonsils and uvula.  The goals of palate procedures are to widen the airway in this region as well as to help the tissues resist collapse.  Modern palate procedure techniques focus on tissue conservation and soft tissue rearrangement, rather than  tissue removal.  Often the uvula is preserved in this procedure. Residual sleep apnea is common in patient after pharyngoplasty with an average reduction in sleep apnea events of 33%2.      Tongue Procedures:  ExamWhile patients are awake, the muscles that surround the throat are active and keep this region open for breathing. These muscles relax during sleep, allowing the tongue and other structures to collapse and block breathing.  There are several different tongue procedures available.  Selection of a tongue base procedure depends on characteristics seen on physical exam.  Generally, procedures are aimed at removing bulky tissues in this area or preventing the back of the tongue from falling back during sleep.  Success rates for tongue surgery range from 50-62%3.    Hypoglossal Nerve Stimulation:  Hypoglossal nerve stimulation has recently received approval from the [place] Food and Drug Administration for the treatment of obstructive sleep apnea.  This is based on research showing that the system was safe and effective in treating sleep apnea6.  Results showed that the median AHI score decreased 68%, from 29.3 to 9.0. This therapy uses an implant system that senses breathing patterns and delivers mild stimulation to airway muscles, which keeps the airway open during sleep.  The system consists of three fully implanted components: a small generator (similar in size to a pacemaker), a breathing sensor, and a stimulation lead.  Using a small handheld remote, a patient turns the therapy on before bed and off upon awakening.    Candidates for this device must be greater than 18 years of age, have moderate to severe obstructive sleep apnea with less than 25% central events  (AHI between 15-65), BMI less than 35, have tried CPAP/oral appliance for at least 8 weeks without success, and have appropriate upper airway anatomy (determined by a sleep endoscopy performed by Dr. Vijay Damon or Dr. Lerner  Alexandre).    Nasal Procedures:  Nasal obstruction can interfere with nasal breathing during the day and night.  Studies have shown that relief of nasal obstruction can improve the ability of some patients to tolerate positive airway pressure therapy for obstructive sleep apnea1.  Treatment options include medications such as nasal saline, topical corticosteroid and antihistamine sprays, and oral medications such as antihistamines or decongestants. Non-surgical treatments can include external nasal dilators for selected patients. If these are not successful by themselves, surgery can improve the nasal airway either alone or in combination with these other options.        Combination Procedures:  Combination of surgical procedures and other treatments may be recommended, particularly if patients have more than one area of narrowing or persistent positional disease.  The success rate of combination surgery ranges from 66-80%2,3.    References  Vandana RAMOS. The Role of the Nose in Snoring and Obstructive Sleep Apnoea: An Update.  Eur Arch Otorhinolaryngol. 2011; 268: 1365-73.   Dayanna SM; Ramy JA; Moi JR; Pallanch JF; Cristina MB; Shikha SG; Ra MORRISSEY. Surgical modifications of the upper airway for obstructive sleep apnea in adults: a systematic review and meta-analysis. SLEEP 2010;33(10):0817-2092. Ashia CHAVARRIA. Hypopharyngeal surgery in obstructive sleep apnea: an evidence-based medicine review.  Arch Otolaryngol Head Neck Surg. 2006 Feb;132(2):206-13.  Maximo YH1, Casandra Y, Ezequiel MARIO. The efficacy of anatomically based multilevel surgery for obstructive sleep apnea. Otolaryngol Head Neck Surg. 2003 Oct;129(4):327-35.  Ashia CHAVARRIA, Goldberg A. Hypopharyngeal Surgery in Obstructive Sleep Apnea: An Evidence-Based Medicine Review. Arch Otolaryngol Head Neck Surg. 2006 Feb;132(2):206-13.  Anthony BORGES et al. Upper-Airway Stimulation for Obstructive Sleep Apnea.  N Engl J Med. 2014 Jan 9;370(2):139-49.  Fara Goudl al.  Increased Incidence of Cardiovascular Disease in Middle-aged Men with Obstructive Sleep Apnea. Am J Respir Crit Care Med; 2002 166: 159-165  Clark EM et al. Studying Life Effects and Effectiveness of Palatopharyngoplasty (SLEEP) study: Subjective Outcomes of Isolated Uvulopalatopharyngoplasty. Otolaryngol Head Neck Surg. 2011; 144: 623-631.        WHAT IF I ONLY HAVE SNORING?    Mandibular advancement devices, lateral sleep positioning, long-term weight loss and treatment of nasal allergies have been shown to improve snoring.  Exercising tongue muscles with a game ([email or web address]://[email or web address].Intrinsiq Materials/[employer]/ciaran/Cognia-reduce-snoring/[id number]) or stimulating the tongue during the day with a device (https://doi.org/10.3390/edj86928203) have improved snoring in some individuals.  https://www.MiNOWireless.DGIT/  https://www.sleepfoundation.org/best-anti-snoring-mouthpieces-and-mouthguards    Remember to Drive Safe... Drive Alive     Sleep health profoundly affects your health, mood, and your safety.  Thirty three percent of the population (one in three of us) is not getting enough sleep and many have a sleep disorder. Not getting enough sleep or having an untreated / undertreated sleep condition may make us sleepy without even knowing it. In fact, our driving could be dramatically impaired due to our sleep health. As your provider, here are some things I would like you to know about driving:     Here are some warning signs for impairment and dangerous drowsy driving:              -Having been awake more than 16 hours               -Looking tired               -Eyelid drooping              -Head nodding (it could be too late at this point)              -Driving for more than 30 minutes     Some things you could do to make the driving safer if you are experiencing some drowsiness:              -Stop driving and rest              -Call for transportation              -Make sure your sleep disorder is adequately treated     Some things  that have been shown NOT to work when experiencing drowsiness while driving:              -Turning on the radio              -Opening windows              -Eating any  distracting  /  entertaining  foods (e.g., sunflower seeds, candy, or any other)              -Talking on the phone      Your decision may not only impact your life, but also the life of others. Please, remember to drive safe for yourself and all of us.  Insomnia - Stimulus Control  When someone lays awake in bed over many nights, your body can actually learn to be awake in bed, mainly because that is what has happened so many times.  Your body has actually been  conditioned  or trained to be awake during the night because it has happened so often.  To break this habit you should try to follow these steps to improve your insomnia:  Set a strict bedtime and rise time to keep every day of the week, including weekends  Go to bed at the set time, but only if you are sleepy (not tired or fatigued but drowsy)  Don t lay in bed for more than 15-30 minutes if you can t sleep.  Get up and go do something relaxing like reading or watching TV until you get drowsy again   Get up at the same time every day regardless of how much sleep you get  Use the bedroom only for sleep and sex.  Do NOT watch TV, read, use the computer, play on your cell phone or do work while in bed    Do not take naps during the daytime and avoid any situations where you might get drowsy or fall asleep unintentionally especially in the evening.       Behavioral Sleep Medicine Program    The [facility] Behavioral Sleep Medicine Program,  provides non-drug treatment for sleep problems as part of our multi-discipline sleep medicine program. Services offered include:    Cognitive-behavioral Therapies for Insomnia (CBT-I)  Management of Shift-work and Jet Lag Sleep Disorders  Management of Delayed, Advanced and Irregular Circadian Rhythm Sleep Disorders  Imagery Rehearsal Therapy (IRT) for  Nightmare Disorder  Adaptation to PAP Therapy for Obstructive Sleep Apnea  Behavioral strategies to manage hypersomnia and fatigue    You have been referred for consultation with a sleep psychologist who specializes in behavioral sleep medicine and treatment of insomnia.  The [facility] Behavioral Sleep Medicine Program offers individualized telehealth services through our [facility] and online CBT-I.    Preparing for your Consultation    We recommmend you keep a Sleep Diary for at least a week prior to your visit. Complete the sleep diary each day first thing after you get up by answering a few key questions about your sleep using our convenient mobile carolina or paper sleep diary.  Your answers should be based on your recall of the past 24 hours.  Avoid watching the clock or recording data during the night.     Insomnia  Carolina    The Insomnia  mobile carolina  is a convenient way to keep track of your sleep prior to your sleep consultation.  Simply download the free carolina on your Apple or Android phone and record your information each morning.  The carolina includes training, self-assessment, and sleep schedule recommendations.  Prior to your consultation we recommend you use only the sleep diary function. You can e-mail yourself a copy of your sleep diary data by going to the Settings section and using the [place] User Data function.  During your consultation your provider will review the data with you.          [facility] Sleep Diary    You can also track your sleep using the [facility] paper sleep diary.  You can upload your sleep diary and send it via a Oppa message, fax it to [phone], or have it with you at the time of your consultation.            CBT-I:  Frequently Asked Questions    What is CBT-I?    Cognitive Behavioral Therapy for Insomnia, also known as CBT-I, is a highly effective non-drug treatment for insomnia. The American College of Physicians recommends  CBT-I as the first treatment for chronic insomnia.  Research has shown CBT-I to be safer and more effective long term than sleeping pills.    What does CBT-I involve?     CBT-I targets behaviors that lead to chronic insomnia:  Habits that weaken the bed as a cue for sleep  Habits that weaken your body's sleep drive and sleep/wake clock   Unhelpful sleep thoughts that increase sleep-related worry and arousal.    The process involves 3-6 telehealth visits that guide you to implement proven strategies to get a better night's sleep.    People often see improvement in their sleep within a few weeks. Research shows if you keep practicing the skills you learn your sleep is likely to continue to improve 6-12 months after treatment.    Does this program prescribe or manage sleep medication?    No.  Your prescribing provider is responsible to assist you in managing your sleep medications.  Some people choose to stop using sleep medication prior to or during CBT-I.  Our program can work with your prescribing provider to help reduce or eliminate use of sleep medications.     Getting Started Today!    If you haven't already done so, we recommend you consider making the following changes to your sleep habits prior to your sleep consultation:     Reduce your consumption of caffeine and alcohol.  Both can disrupt sleep and make strengthening your sleep more difficult.  Specifically:    - Avoid caffeine within 6 hours of bedtime   - No more than 3 caffeinated beverages per day (e.g. 8 oz. cup coffee or 12 oz. cup soda)            - No alcohol within 3 hours of bedtime    Make sure your bedroom is quiet, comfortable and dark.  Noise, light and an uncomfortable sleep space can harm your sleep.      Keep the same sleep schedule 7 days a week.unless you do shift work.      Our Online CBT-I Program    If you want to get started today, research indicates that online CBT-I can be effective for some individuals. These programs requires  "comfort with ciaran-based or online learning.  However, digital CBT-I programs are not for everyone.  Contraindications include:    Seizure disorders,   Bipolar disorder,   Unstable medical or mental health conditions,   Frailty or risk of falling  Pregnancy    You should consult a sleep specialist before using these resources if you have:    Sleep Apnea  Restless Leg Syndrome  Sleep Walking  REM behavior disorder  Night Terrors  Excessive Daytime Sleepiness  Are engaged in shift work  Use prescription sleep medication    Click on the link below to get started today:                                  [email or web address]             Once you are registered you can share your sleep log data with [facility] where your health provider will be able to review your sleep log and progress.  Once you begin the program, go to the left side navigation bar and click on the  share sleep log  button:                                        This takes you to the next page where you enter the provider code IdeaPaint and click Locate:                 This brings you to the verification page where you should see  [facility] identified as your provider                                                                           By clicking \"Submit\" your sleep log data will be sent to our secure [email or web address] portal for review by you provider.     For Help Contact Customer Care At:    [email or web address]  If your sleep provider recommends online CBT-I for you , the cost for an entire 6-week program is $40.    To get started, copy and paste the link below which will take you to the landing page to register:                              Self-help Workbooks for Insomnia    If you have found self-help books useful in the past, you may want to consider reading one of the following books prior to your consultation:    Say Luca to Insomnia: The Six-Week, Drug-Free Program Developed at " [facility] [employer].  Sukhjinder Hinds MD. Available in paperback, Triston, and audiobook.    Overcoming Insomnia: A Cognitive-Behavioral Therapy Approach, Workbook.  Rogelio Hwang, PhD  and Lina Roldan, PhD.  Available in paperback and Triston.    Quiet Your Mind and Get to Sleep: Solutions to Insomnia for Those with Depression, Anxiety, or Chronic Pain.  Alis Bennett, PhD and Lina Roldan, PhD.  Available in paperback and Triston    Restless legs syndrome (RLS)  Simple lifestyle changes can play an important role in alleviating symptoms of RLS. These steps may help reduce the extra activity in your legs(please follow as applicable):   Try baths and massages. Soaking in a warm bath and massaging your legs can relax your muscles.   Apply warm or cool packs. You may find that the use of heat or cold, or alternating use of the two, lessens the sensations in your limbs.   Try relaxation techniques, such as meditation or yoga. Stress can aggravate RLS. Learn to relax, especially before going to bed at night.   Establish good sleep hygiene. Fatigue tends to worsen symptoms of RLS, so it's important that you practice good sleep hygiene. Ideally, sleep hygiene involves having a cool, quiet and comfortable sleeping environment, going to bed at the same time, rising at the same time, and getting enough sleep to feel well rested. Some people with RLS find that going to bed later and rising later in the day helps in getting enough sleep.   Exercise. Getting moderate, regular exercise may relieve symptoms of RLS, but overdoing it at the gym or working out too late in the day may intensify symptoms.   Avoid caffeine. Sometimes cutting back on caffeine may help restless legs. It's worth trying to avoid caffeine-containing products, including chocolate and caffeinated beverages, such as coffee, tea and soft drinks, for a few weeks to see if this helps.   Cut back on alcohol and tobacco. These substances also may aggravate or  trigger symptoms of RLS. Test to see whether avoiding them helps.

## 2024-10-25 ENCOUNTER — TELEPHONE (OUTPATIENT)
Dept: INTERNAL MEDICINE | Facility: CLINIC | Age: 64
End: 2024-10-25
Payer: COMMERCIAL

## 2024-10-25 NOTE — TELEPHONE ENCOUNTER
General Call    Contacts       Contact Date/Time Type Contact Phone/Fax    10/25/2024 12:38 PM CDT Phone (Outgoing) Mayra Wheeler (Self) [phone] (M)    Talked with Patient           Reason for Call:  Called patient to scheduled HST and pt stated that due to her transportation she cannot drive to any of the locations we have for /drop off. Is it appropriate to do a Watchpat Mail Out for the patient? Please contact pt with any suggestions.     What are your questions or concerns:  na    Date of last appointment with provider: 10/24/2024    Could we send this information to you in NLP Logix or would you prefer to receive a phone call?:   Patient would like to be contacted via NLP Logix

## 2024-11-07 ENCOUNTER — LAB REQUISITION (OUTPATIENT)
Dept: LAB | Facility: CLINIC | Age: 64
End: 2024-11-07

## 2024-11-07 PROCEDURE — 87086 URINE CULTURE/COLONY COUNT: CPT

## 2024-11-07 PROCEDURE — 80048 BASIC METABOLIC PNL TOTAL CA: CPT

## 2024-11-07 PROCEDURE — 82947 ASSAY GLUCOSE BLOOD QUANT: CPT

## 2024-11-08 LAB
ANION GAP SERPL CALCULATED.3IONS-SCNC: 14 MMOL/L (ref 7–15)
BUN SERPL-MCNC: 12.5 MG/DL (ref 8–23)
CALCIUM SERPL-MCNC: 9.7 MG/DL (ref 8.8–10.4)
CHLORIDE SERPL-SCNC: 104 MMOL/L (ref 98–107)
CREAT SERPL-MCNC: 0.69 MG/DL (ref 0.51–0.95)
EGFRCR SERPLBLD CKD-EPI 2021: >90 ML/MIN/1.73M2
GLUCOSE SERPL-MCNC: 102 MG/DL (ref 70–99)
HCO3 SERPL-SCNC: 21 MMOL/L (ref 22–29)
POTASSIUM SERPL-SCNC: 4.6 MMOL/L (ref 3.4–5.3)
SODIUM SERPL-SCNC: 139 MMOL/L (ref 135–145)

## 2024-11-09 LAB — BACTERIA UR CULT: NORMAL

## 2024-11-20 LAB — NONINV COLON CA DNA+OCC BLD SCRN STL QL: NEGATIVE

## 2024-11-26 ENCOUNTER — TELEPHONE (OUTPATIENT)
Dept: SLEEP MEDICINE | Facility: CLINIC | Age: 64
End: 2024-11-26
Payer: COMMERCIAL

## 2024-11-26 NOTE — TELEPHONE ENCOUNTER
Pt says that Dr. Mccrary told her that the Sleep kit could be shipped to her house and that she could ship it back. The order that is in the computer is for HST Home Sleep Apnea Test Noxturnal Returnable. Please advise pt on what she can do? Ok M Detailed [phone] .

## 2024-12-05 NOTE — TELEPHONE ENCOUNTER
Huddled with provider. Watchpat was not discussed. Detailed message left for patient and Mychart sent. Advised patient to call to schedule [phone].

## 2024-12-24 ENCOUNTER — TELEPHONE (OUTPATIENT)
Dept: INTERNAL MEDICINE | Facility: CLINIC | Age: 64
End: 2024-12-24
Payer: COMMERCIAL

## 2024-12-24 DIAGNOSIS — K58.2 IRRITABLE BOWEL SYNDROME WITH BOTH CONSTIPATION AND DIARRHEA: Primary | ICD-10-CM

## 2024-12-24 RX ORDER — DICYCLOMINE HCL 20 MG
20 TABLET ORAL 4 TIMES DAILY PRN
Qty: 50 TABLET | Refills: 0 | Status: SHIPPED | OUTPATIENT
Start: 2024-12-24

## 2024-12-24 NOTE — TELEPHONE ENCOUNTER
Patient calling to get refill on medication that is not active on her current list.  Previously prescribed by GI, has bottle left but it  in .  IBS has been bothersome the last couple weeks.  Reviewed med list and that med not active, was able to find in old health record prior to [facility] merge, see below-  Did advise request will be sent to covering provider though may not be filled given current PCP has yet to fill.  Patient verbalized understanding.         Disp Refills Start End RADHA   dicyclomine (BENTYL) 20 mg tablet (Discontinued)    3/1/2019 --   Sig - Route: Take 20 mg by mouth 4 (four) times a day as needed. - Oral   Class: Historical Med   Reason for Discontinue: Therapy completed     dicyclomine (BENTYL) 20 mg tablet [[id number]]  Patient-reported historical medication  Ordering date: 16 Authorized by: PROVIDER, HISTORICAL   Frequency: QID PRN  - 19 Discontinued by: Sayra Nair, PharmD 19 [Therapy completed]

## 2024-12-27 DIAGNOSIS — E11.9 TYPE 2 DIABETES MELLITUS WITHOUT COMPLICATION, WITHOUT LONG-TERM CURRENT USE OF INSULIN (H): Primary | ICD-10-CM

## 2024-12-27 NOTE — TELEPHONE ENCOUNTER
Medication Question or Refill    Contacts       Contact Date/Time Type Contact Phone/Fax    12/27/2024 03:31 PM CST Phone (Incoming) Easton Wheelerthania GIVENS (Self) [phone] (M)            What medication are you calling about (include dose and sig)?: one touch 33g lancets, metformin hcl 500mg tab    Preferred Pharmacy:   [facility] - [place], [place] - [address]  [address]  [facility] [address]  Phone: [phone] Fax: [phone]    [id number] - [place], [place] - [address]  [facility]  [facility] [address]  Phone: [phone] Fax: [phone]      Controlled Substance Agreement on file:   CSA -- Patient Level:    CSA: None found at the patient level.       Who prescribed the medication?: Brickner    Do you need a refill? Yes    When did you use the medication last?     Patient offered an appointment? No    Do you have any questions or concerns?  Yes: netformin refill asap as near empty.      Could we send this information to you in Seaterst or would you prefer to receive a phone call?:

## 2024-12-29 RX ORDER — LANCETS 33 GAUGE
EACH MISCELLANEOUS
Status: CANCELLED | OUTPATIENT
Start: 2024-12-29

## 2024-12-31 NOTE — TELEPHONE ENCOUNTER
Patient still taking. Please approve if agreeable for refill.     Patient reports checking blood sugars twice daily- only needing lancets at this time.     Would like these sent to [facility] on [address]     Thank you

## 2025-01-19 ENCOUNTER — HEALTH MAINTENANCE LETTER (OUTPATIENT)
Age: 65
End: 2025-01-19

## 2025-02-24 ASSESSMENT — ASTHMA QUESTIONNAIRES
ACT_TOTALSCORE: 19
QUESTION_1 LAST FOUR WEEKS HOW MUCH OF THE TIME DID YOUR ASTHMA KEEP YOU FROM GETTING AS MUCH DONE AT WORK, SCHOOL OR AT HOME: A LITTLE OF THE TIME
QUESTION_3 LAST FOUR WEEKS HOW OFTEN DID YOUR ASTHMA SYMPTOMS (WHEEZING, COUGHING, SHORTNESS OF BREATH, CHEST TIGHTNESS OR PAIN) WAKE YOU UP AT NIGHT OR EARLIER THAN USUAL IN THE MORNING: NOT AT ALL
QUESTION_2 LAST FOUR WEEKS HOW OFTEN HAVE YOU HAD SHORTNESS OF BREATH: MORE THAN ONCE A DAY
QUESTION_5 LAST FOUR WEEKS HOW WOULD YOU RATE YOUR ASTHMA CONTROL: WELL CONTROLLED
QUESTION_4 LAST FOUR WEEKS HOW OFTEN HAVE YOU USED YOUR RESCUE INHALER OR NEBULIZER MEDICATION (SUCH AS ALBUTEROL): NOT AT ALL
ACT_TOTALSCORE: 19

## 2025-02-24 ASSESSMENT — PATIENT HEALTH QUESTIONNAIRE - PHQ9
SUM OF ALL RESPONSES TO PHQ QUESTIONS 1-9: 9
SUM OF ALL RESPONSES TO PHQ QUESTIONS 1-9: 9
10. IF YOU CHECKED OFF ANY PROBLEMS, HOW DIFFICULT HAVE THESE PROBLEMS MADE IT FOR YOU TO DO YOUR WORK, TAKE CARE OF THINGS AT HOME, OR GET ALONG WITH OTHER PEOPLE: SOMEWHAT DIFFICULT

## 2025-02-25 ENCOUNTER — OFFICE VISIT (OUTPATIENT)
Dept: INTERNAL MEDICINE | Facility: CLINIC | Age: 65
End: 2025-02-25
Payer: COMMERCIAL

## 2025-02-25 VITALS
SYSTOLIC BLOOD PRESSURE: 144 MMHG | HEART RATE: 67 BPM | TEMPERATURE: 98.6 F | RESPIRATION RATE: 12 BRPM | DIASTOLIC BLOOD PRESSURE: 60 MMHG | HEIGHT: 66 IN | BODY MASS INDEX: 34.28 KG/M2 | OXYGEN SATURATION: 97 % | WEIGHT: 213.3 LBS

## 2025-02-25 DIAGNOSIS — F33.41 RECURRENT MAJOR DEPRESSIVE DISORDER, IN PARTIAL REMISSION: ICD-10-CM

## 2025-02-25 DIAGNOSIS — Z86.39 HX OF IRON DEFICIENCY: ICD-10-CM

## 2025-02-25 DIAGNOSIS — F25.9 SCHIZOAFFECTIVE DISORDER, UNSPECIFIED TYPE (H): ICD-10-CM

## 2025-02-25 DIAGNOSIS — R53.82 CHRONIC FATIGUE: ICD-10-CM

## 2025-02-25 DIAGNOSIS — I10 BENIGN ESSENTIAL HYPERTENSION: ICD-10-CM

## 2025-02-25 DIAGNOSIS — E11.9 TYPE 2 DIABETES MELLITUS WITHOUT COMPLICATION, WITHOUT LONG-TERM CURRENT USE OF INSULIN (H): Primary | ICD-10-CM

## 2025-02-25 DIAGNOSIS — G25.81 RESTLESS LEGS SYNDROME (RLS): ICD-10-CM

## 2025-02-25 DIAGNOSIS — Z01.84 IMMUNITY STATUS TESTING: ICD-10-CM

## 2025-02-25 DIAGNOSIS — F41.9 ANXIETY: ICD-10-CM

## 2025-02-25 DIAGNOSIS — K58.2 IRRITABLE BOWEL SYNDROME WITH BOTH CONSTIPATION AND DIARRHEA: ICD-10-CM

## 2025-02-25 DIAGNOSIS — G47.33 OSA (OBSTRUCTIVE SLEEP APNEA): ICD-10-CM

## 2025-02-25 DIAGNOSIS — Z11.4 SCREENING FOR HIV (HUMAN IMMUNODEFICIENCY VIRUS): ICD-10-CM

## 2025-02-25 DIAGNOSIS — Z11.59 NEED FOR HEPATITIS C SCREENING TEST: ICD-10-CM

## 2025-02-25 DIAGNOSIS — E78.2 MIXED HYPERLIPIDEMIA: ICD-10-CM

## 2025-02-25 LAB
EST. AVERAGE GLUCOSE BLD GHB EST-MCNC: 123 MG/DL
HBA1C MFR BLD: 5.9 % (ref 0–5.6)

## 2025-02-25 PROCEDURE — 86803 HEPATITIS C AB TEST: CPT | Performed by: INTERNAL MEDICINE

## 2025-02-25 PROCEDURE — 3078F DIAST BP <80 MM HG: CPT | Performed by: INTERNAL MEDICINE

## 2025-02-25 PROCEDURE — 1126F AMNT PAIN NOTED NONE PRSNT: CPT | Performed by: INTERNAL MEDICINE

## 2025-02-25 PROCEDURE — 82043 UR ALBUMIN QUANTITATIVE: CPT | Performed by: INTERNAL MEDICINE

## 2025-02-25 PROCEDURE — 82570 ASSAY OF URINE CREATININE: CPT | Performed by: INTERNAL MEDICINE

## 2025-02-25 PROCEDURE — 36415 COLL VENOUS BLD VENIPUNCTURE: CPT | Performed by: INTERNAL MEDICINE

## 2025-02-25 PROCEDURE — 82728 ASSAY OF FERRITIN: CPT | Performed by: INTERNAL MEDICINE

## 2025-02-25 PROCEDURE — 99215 OFFICE O/P EST HI 40 MIN: CPT | Performed by: INTERNAL MEDICINE

## 2025-02-25 PROCEDURE — 87389 HIV-1 AG W/HIV-1&-2 AB AG IA: CPT | Performed by: INTERNAL MEDICINE

## 2025-02-25 PROCEDURE — 86765 RUBEOLA ANTIBODY: CPT | Performed by: INTERNAL MEDICINE

## 2025-02-25 PROCEDURE — G2211 COMPLEX E/M VISIT ADD ON: HCPCS | Performed by: INTERNAL MEDICINE

## 2025-02-25 PROCEDURE — 3077F SYST BP >= 140 MM HG: CPT | Performed by: INTERNAL MEDICINE

## 2025-02-25 PROCEDURE — 83036 HEMOGLOBIN GLYCOSYLATED A1C: CPT | Performed by: INTERNAL MEDICINE

## 2025-02-25 RX ORDER — TIRZEPATIDE 2.5 MG/.5ML
2.5 INJECTION, SOLUTION SUBCUTANEOUS
Qty: 2 ML | Refills: 0 | Status: SHIPPED | OUTPATIENT
Start: 2025-02-25

## 2025-02-25 ASSESSMENT — ENCOUNTER SYMPTOMS: FATIGUE: 1

## 2025-02-25 ASSESSMENT — PAIN SCALES - GENERAL: PAINLEVEL_OUTOF10: NO PAIN (0)

## 2025-02-25 NOTE — PROGRESS NOTES
Assessment & Plan   Problem List Items Addressed This Visit          Respiratory    VONDA (obstructive sleep apnea)     Initially diagnosed 6 years ago in the setting of her new onset fatigue.  Had used a CPAP but was not sure of benefit so stopped.  We again discussed the importance of getting a repeat sleep study as untreated sleep apnea could certainly be contributing to her chronic fatigue and daytime sleepiness. Logistics of picking up HST are an issue.   - We are going to see if care coordinator has ideas to get this picked up   \\         Relevant Orders    Primary Care - Care Coordination Referral       Digestive    IBS (irritable bowel syndrome)     Symptoms stable. Alternating diarrhea and constipation.   - Recommended working on hydration, goal 64 oz a day  - Recommended fiber supplement like metamucil and/or increasing dietary fiber, especially with addition of mounjaro         Relevant Medications    MOUNJARO 2.5 MG/0.5ML SOAJ       Endocrine    Hyperlipidemia     Lipids 6/2024 Tchol 206, , LDL 83, HDL 70  - Well controlled, continue pravastatin 40 mg daily          Diabetes mellitus, type 2 (H) - Primary     Currently well controlled with metformin 1g AM and 500 mg PM. Would really like to work on weight loss, very reasonable to try to add mounjaro.   - start mounjaro 2.5 mg weekly, when starting decrease metformin to 500 mg BID  - She will message after 3 weeks, will increase to 5 mg dose if tolerating   - Check A1c and microalbumin today   - F/up 3-4 months          Relevant Medications    MOUNJARO 2.5 MG/0.5ML SOAJ    Other Relevant Orders    HEMOGLOBIN A1C    Albumin Random Urine Quantitative with Creat Ratio       Circulatory    Benign essential hypertension     Above goal today. Patient not consisently checking at home but previously well controlled.   - We are going to work on weight loss by adding mounjaro   - for now, continue losartan 50 mg daily, have room to increase if remains  "elevated at f/up visits or at home             Musculoskeletal and Integumentary    Restless legs syndrome (RLS)     Doing much better with regular use of compression stockings.   - Will check ferritin today as well, goal >75            Behavioral    Depression     Also follows with psychiatry for this.  Current regimen Zoloft 200 mg daily.         Schizoaffective disorder (H)     Follows with CHER Alarcon at [facility] in [place]   -Continue perphenazine 8 mg at bedtime, will leave management to her psychiatric provider            Other    Anxiety     Also follows with psychiatry for this.  In addition to Zoloft 200 mg daily, regimen includes gabapentin 600 mg at bedtime and trazodone 100 mg at bedtime.    -Defer management to her psychiatrist         Chronic fatigue     Chronic fatigue is most c/w CFS. Discussed again today. Patient has been able to get better control of dizziness with resting and being more mindful. Winter has been harder, which is a usual trend for her.  - Really do need her to get a sleep study, will place care coordination referral to see if they have ideas for how to get HST picked up   - Discussed graded exercise, she is working on this  - Also provided info on [place]/CFS [place] website           Other Visit Diagnoses       Immunity status testing        Relevant Orders    Rubeola Antibody IgG    Screening for HIV (human immunodeficiency virus)        Relevant Orders    HIV Antigen Antibody Combo    Need for hepatitis C screening test        Relevant Orders    Hepatitis C Screen Reflex to HCV RNA Quant and Genotype    Hx of iron deficiency        Relevant Orders    Ferritin                  BMI  Estimated body mass index is 34.43 kg/m  as calculated from the following:    Height as of this encounter: 1.676 m (5' 6\").    Weight as of this encounter: 96.8 kg (213 lb 4.8 oz).   Weight management plan: Discussed healthy diet and exercise guidelines  Blood sugar testing " frequency justification:  Adjustment of medication(s)      The longitudinal plan of care for the diagnosis(es)/condition(s) as documented were addressed during this visit. Due to the added complexity in care, I will continue to support Mayra in the subsequent management and with ongoing continuity of care.    I spent a total of 44 minutes on the day of the visit.   Time spent by me today doing chart review, history and exam, documentation and further activities per the note    FUTURE APPOINTMENTS:       - Follow-up visit in 3 months       Subjective   Mayra is a 64 year old, presenting for the following health issues:  Diabetes, Fatigue, and Imm/Inj (MMR? Worried about measles outbreak. Discuss. )        2/25/2025     1:40 PM   Additional Questions   Roomed by FER Hurtado   Accompanied by ROMEL     History of Present Illness     Reason for visit:  A1C check, follow up on status of chronic fatigue    Vertigo / dizziness / CFS: Tells me today that the fatigue has gotten worse. It has been a rough winter.     Insomnia: Did see sleep medicine 10/23/24, ordered HST and CBT-I. Recommended check ferritin level for RLS (we should do this today).   - Hasn't done the HST yet, logistical issues  - Can't drive outside of [place] or [place] b/c car is old and not trustworthy farther distances  - They wanted her to  in the morning and that is really difficult for her with chronic fatigue  - Sons work in [employer] early morning hours, she doesn't think they would be able to  for her     HTN: losartan 50 mg daily. BP today 148/77 -> 144/60. Not checking at home.      T2DM: Metformin 1g AM and 500 mg PM. A1c was 5.8 (6/2024). No albuminuria 3/2024. Eye exam without retinopathy 7/19/24.   - Sugars at home 90-100s  - Interested in GLP1    HLD: pravastatin 40 mg daily. Lipids 6/2024 Tchol 206, , LDL 83, HDL 70    Schizoaffective disorder: Current regimen is Perphenazine 8 mg at bedtime on this since 1988,  "stable on this dose for many years. This is managed by CHER Alarcon at [facility] in [place].      MDD: zoloft 200 mg daily, also managed by psychiatry      Insomnia: trazodone 100 mg at bedtime, managed by psychiatry     Anxiety: gabapentin 600 mg at bedtime and trazodone 100 mg at bedtime. Also managed by psychiatry.        Compression stockings very help with leg aching! And stopped restless legs.     Wt Readings from Last 4 Encounters:   02/25/25 96.8 kg (213 lb 4.8 oz)   10/23/24 95.7 kg (211 lb)   08/20/24 96.9 kg (213 lb 11.2 oz)   03/19/24 97.7 kg (215 lb 4.8 oz)     She eats 2-3 servings of fruits and vegetables daily.She consumes 0 sweetened beverage(s) daily.She exercises with enough effort to increase her heart rate 9 or less minutes per day.  She exercises with enough effort to increase her heart rate 3 or less days per week.   She is taking medications regularly.     Review of Systems  Constitutional, neuro, ENT, endocrine, pulmonary, cardiac, gastrointestinal, genitourinary, musculoskeletal, integument and psychiatric systems are negative, except as otherwise noted.      Objective    BP (!) 144/60 (BP Location: Right arm, Patient Position: Sitting, Cuff Size: Adult Regular)   Pulse 67   Temp 98.6  F (37  C) (Temporal)   Resp 12   Ht 1.676 m (5' 6\")   Wt 96.8 kg (213 lb 4.8 oz)   LMP  (LMP Unknown)   SpO2 97%   BMI 34.43 kg/m    Body mass index is 34.43 kg/m .  Physical Exam   GENERAL: alert and no distress  EYES: Eyes grossly normal to inspection and conjunctivae and sclerae normal  HENT: nose and mouth without ulcers or lesions  RESP: lungs clear to auscultation - no rales, rhonchi or wheezes  CV: regular rate and rhythm, normal S1 S2, no S3 or S4, no murmur, click or rub  ABDOMEN: soft, nontender  MS: no gross musculoskeletal defects noted  SKIN: no suspicious lesions or rashes on exposed skin   NEURO: No focal deficits, mentation intact and speech normal  PSYCH: mentation " appears normal, affect normal/bright    No results found for this or any previous visit (from the past 24 hours).        Signed Electronically by: Louise Rivera MD

## 2025-02-25 NOTE — ASSESSMENT & PLAN NOTE
Initially diagnosed 6 years ago in the setting of her new onset fatigue.  Had used a CPAP but was not sure of benefit so stopped.  We again discussed the importance of getting a repeat sleep study as untreated sleep apnea could certainly be contributing to her chronic fatigue and daytime sleepiness. Logistics of picking up HST are an issue.   - We are going to see if care coordinator has ideas to get this picked up   \\

## 2025-02-25 NOTE — ASSESSMENT & PLAN NOTE
Above goal today. Patient not consisently checking at home but previously well controlled.   - We are going to work on weight loss by adding mounjaro   - for now, continue losartan 50 mg daily, have room to increase if remains elevated at f/up visits or at home

## 2025-02-25 NOTE — ASSESSMENT & PLAN NOTE
Doing much better with regular use of compression stockings.   - Will check ferritin today as well, goal >75

## 2025-02-25 NOTE — ASSESSMENT & PLAN NOTE
Follows with CHER Alarcon at [facility] in [place]   -Continue perphenazine 8 mg at bedtime, will leave management to her psychiatric provider

## 2025-02-25 NOTE — ASSESSMENT & PLAN NOTE
Symptoms stable. Alternating diarrhea and constipation.   - Recommended working on hydration, goal 64 oz a day  - Recommended fiber supplement like metamucil and/or increasing dietary fiber, especially with addition of mounjaro

## 2025-02-25 NOTE — ASSESSMENT & PLAN NOTE
Chronic fatigue is most c/w CFS. Discussed again today. Patient has been able to get better control of dizziness with resting and being more mindful. Winter has been harder, which is a usual trend for her.  - Really do need her to get a sleep study, will place care coordination referral to see if they have ideas for how to get HST picked up   - Discussed graded exercise, she is working on this  - Also provided info on [facility] [place] [facility] [place] website

## 2025-02-25 NOTE — ASSESSMENT & PLAN NOTE
Currently well controlled with metformin 1g AM and 500 mg PM. Would really like to work on weight loss, very reasonable to try to add mounjaro.   - start mounjaro 2.5 mg weekly, when starting decrease metformin to 500 mg BID  - She will message after 3 weeks, will increase to 5 mg dose if tolerating   - Check A1c and microalbumin today   - F/up 3-4 months

## 2025-02-25 NOTE — PATIENT INSTRUCTIONS
[email or web address]    Start mounjaro 2.5 mg weekly. Send me a message in 3 weeks how things are going and if you think we can increase to 5 mg dose.     When you start, can decrease metformin to 500 mg twice daily.

## 2025-02-26 LAB
CREAT UR-MCNC: 204 MG/DL
FERRITIN SERPL-MCNC: 71 NG/ML (ref 11–328)
HCV AB SERPL QL IA: NONREACTIVE
HIV 1+2 AB+HIV1 P24 AG SERPL QL IA: NONREACTIVE
MEV IGG SER IA-ACNC: >300 AU/ML
MEV IGG SER IA-ACNC: POSITIVE
MICROALBUMIN UR-MCNC: 16.4 MG/L
MICROALBUMIN/CREAT UR: 8.04 MG/G CR (ref 0–25)

## 2025-02-28 ENCOUNTER — TELEPHONE (OUTPATIENT)
Dept: INTERNAL MEDICINE | Facility: CLINIC | Age: 65
End: 2025-02-28
Payer: COMMERCIAL

## 2025-02-28 DIAGNOSIS — E11.9 TYPE 2 DIABETES MELLITUS WITHOUT COMPLICATION, WITHOUT LONG-TERM CURRENT USE OF INSULIN (H): Primary | ICD-10-CM

## 2025-02-28 NOTE — TELEPHONE ENCOUNTER
New Medication Request    Contacts       Contact Date/Time Type Contact Phone/Fax    02/28/2025 11:19 AM CST Phone (Incoming) Mayra Wheeler CHON (Self) [phone] (M)            What medication are you requesting?: blood glucose monitoring device that is worn in arm    Reason for medication request: patient shares she has new medication MOUNJARO 2.5 MG/0.5ML SOAJ  she has not started medication as she is concerned how medication will impact her blood glucose.  Would like RX for device that is worn in the arm to monitor blood glucose.      Have you taken this medication before?: No    Controlled Substance Agreement on file:   CSA -- Patient Level:    CSA: None found at the patient level.         Patient offered an appointment? No saw Dr. Rivera on Monday 02/25/25    Preferred Pharmacy:     [facility]/pharmacy #[id number] - [address]  [address] [place] [address] [address]  [facility] [address]  Phone: [phone] Fax: [phone]      Could we send this information to you in [facility] or would you prefer to receive a phone call?:   Patient would prefer a phone call   Okay to leave a detailed message?: Yes at Home number on file [phone] (home)

## 2025-03-03 NOTE — TELEPHONE ENCOUNTER
Reason for Call:  Medication or medication refill: Prior Authorization Request     Do you use a [facility]? [place] of the pharmacy and phone number for the current request:    [id number] [address]       Name of the medication requested: Continuous Glucose Sensor (FREESTYLE SHANE 2 SENSOR) MISC     Other request: Per patient unable to  RX for Continuous Glucose Sensor (FREESTYLE SHANE 2 SENSOR) MISC     Per [facility] Prior Authorization is needed.     Can we leave a detailed message on this number? YES    Phone number patient can be reached at: Cell number on file:    Telephone Information:   Mobile [phone]       Best Time: any    Call taken on 3/3/2025 at 11:38 AM by Leela Forman

## 2025-03-04 ENCOUNTER — LAB REQUISITION (OUTPATIENT)
Dept: LAB | Facility: CLINIC | Age: 65
End: 2025-03-04

## 2025-03-04 ENCOUNTER — PATIENT OUTREACH (OUTPATIENT)
Dept: GERIATRIC MEDICINE | Facility: CLINIC | Age: 65
End: 2025-03-04
Payer: COMMERCIAL

## 2025-03-04 DIAGNOSIS — E78.2 MIXED HYPERLIPIDEMIA: ICD-10-CM

## 2025-03-04 DIAGNOSIS — R00.2 PALPITATIONS: ICD-10-CM

## 2025-03-04 PROCEDURE — 84443 ASSAY THYROID STIM HORMONE: CPT | Performed by: FAMILY MEDICINE

## 2025-03-04 PROCEDURE — 80061 LIPID PANEL: CPT | Performed by: FAMILY MEDICINE

## 2025-03-04 PROCEDURE — 80048 BASIC METABOLIC PNL TOTAL CA: CPT | Performed by: FAMILY MEDICINE

## 2025-03-04 NOTE — TELEPHONE ENCOUNTER
[place] Initiation    Medication: FREESTYLE SHANE 2 SENSOR [facility]  Insurance Company: [employer] - Phone [phone] Fax [phone]  Pharmacy Filling the Rx: [id number] - [address]  Filling Pharmacy Phone: [phone]  Filling Pharmacy Fax: [phone]  Start Date: 3/4/2025

## 2025-03-05 LAB
ANION GAP SERPL CALCULATED.3IONS-SCNC: 12 MMOL/L (ref 7–15)
BUN SERPL-MCNC: 9.5 MG/DL (ref 8–23)
CALCIUM SERPL-MCNC: 10 MG/DL (ref 8.8–10.4)
CHLORIDE SERPL-SCNC: 106 MMOL/L (ref 98–107)
CHOLEST SERPL-MCNC: 186 MG/DL
CREAT SERPL-MCNC: 0.56 MG/DL (ref 0.51–0.95)
EGFRCR SERPLBLD CKD-EPI 2021: >90 ML/MIN/1.73M2
FASTING STATUS PATIENT QL REPORTED: ABNORMAL
FASTING STATUS PATIENT QL REPORTED: ABNORMAL
GLUCOSE SERPL-MCNC: 133 MG/DL (ref 70–99)
HCO3 SERPL-SCNC: 23 MMOL/L (ref 22–29)
HDLC SERPL-MCNC: 69 MG/DL
LDLC SERPL CALC-MCNC: 77 MG/DL
NONHDLC SERPL-MCNC: 117 MG/DL
POTASSIUM SERPL-SCNC: 4.1 MMOL/L (ref 3.4–5.3)
SODIUM SERPL-SCNC: 141 MMOL/L (ref 135–145)
TRIGL SERPL-MCNC: 198 MG/DL
TSH SERPL DL<=0.005 MIU/L-ACNC: 1.99 UIU/ML (ref 0.3–4.2)

## 2025-03-05 NOTE — TELEPHONE ENCOUNTER
PRIOR AUTHORIZATION DENIED    Medication: FREESTYLE SHANE 2 SENSOR [facility]  Insurance Company: [employer] - Phone [phone] Fax [phone]  Denial Date: 3/5/2025  Denial Reason(s):       Appeal Information:     Patient Notified: NO

## 2025-03-05 NOTE — TELEPHONE ENCOUNTER
Please call patient. Since she is not using insulin and has not had any documented hypoglycemia, her insurance will not cover CGM.     I am okay with this. As she isn't on any insulin or sulfonylurea, she truly is not at risk of hypoglycemia with either the metformin or zepbound we are treating her diabetes with.

## 2025-03-06 ENCOUNTER — PATIENT OUTREACH (OUTPATIENT)
Dept: CARE COORDINATION | Facility: CLINIC | Age: 65
End: 2025-03-06
Payer: COMMERCIAL

## 2025-03-12 ENCOUNTER — OFFICE VISIT (OUTPATIENT)
Dept: PHARMACY | Facility: PHYSICIAN GROUP | Age: 65
End: 2025-03-12
Payer: COMMERCIAL

## 2025-03-12 VITALS
WEIGHT: 212 LBS | SYSTOLIC BLOOD PRESSURE: 124 MMHG | HEIGHT: 65 IN | DIASTOLIC BLOOD PRESSURE: 68 MMHG | BODY MASS INDEX: 35.32 KG/M2

## 2025-03-12 DIAGNOSIS — E11.9 TYPE 2 DIABETES MELLITUS WITHOUT COMPLICATION, WITHOUT LONG-TERM CURRENT USE OF INSULIN (H): Primary | ICD-10-CM

## 2025-03-12 DIAGNOSIS — F25.9 SCHIZOAFFECTIVE DISORDER, UNSPECIFIED TYPE (H): ICD-10-CM

## 2025-03-12 DIAGNOSIS — F33.41 RECURRENT MAJOR DEPRESSIVE DISORDER, IN PARTIAL REMISSION: ICD-10-CM

## 2025-03-12 DIAGNOSIS — F41.9 ANXIETY: ICD-10-CM

## 2025-03-12 DIAGNOSIS — I10 BENIGN ESSENTIAL HYPERTENSION: ICD-10-CM

## 2025-03-12 DIAGNOSIS — R42 VERTIGO: ICD-10-CM

## 2025-03-12 DIAGNOSIS — K58.2 IRRITABLE BOWEL SYNDROME WITH BOTH CONSTIPATION AND DIARRHEA: ICD-10-CM

## 2025-03-12 DIAGNOSIS — E78.2 MIXED HYPERLIPIDEMIA: ICD-10-CM

## 2025-03-12 DIAGNOSIS — Z78.9 TAKES DIETARY SUPPLEMENTS: ICD-10-CM

## 2025-03-12 PROCEDURE — 99605 MTMS BY PHARM NP 15 MIN: CPT | Performed by: PHARMACIST

## 2025-03-12 PROCEDURE — 3078F DIAST BP <80 MM HG: CPT | Performed by: PHARMACIST

## 2025-03-12 PROCEDURE — 99607 MTMS BY PHARM ADDL 15 MIN: CPT | Performed by: PHARMACIST

## 2025-03-12 PROCEDURE — 3074F SYST BP LT 130 MM HG: CPT | Performed by: PHARMACIST

## 2025-03-12 NOTE — LETTER
"Recommended To-Do List      Prepared on: Mar 12, 2025       You can get the best results from your medications by completing the items on this \"To-Do List.\"      Bring your To-Do List when you go to your doctor. And, share it with your family or caregivers.    My To-Do List:  What we talked about: What I should do:   Starting the new Mounjaro medication.     - Start Mounjaro    Week 1-4: Inject 2.5 mg once weekly  Week 5-8: If tolerating, can increase to 5 mg once weekly if necessary  Week 9-12: If tolerating, can increase to 7.5 mg once weekly if necessary  Week 13-16: If tolerating, can increase to 10 mg once weekly if necessary  Week 17-20: If tolerating, can increase to 12.5 mg once weekly if necessary  Week 21 and on: if tolerating, can increase to 15 mg once weekly if necessary    *Important: the goal is to get to a dose that is well tolerated and effective for you. You do not have to go up on the dose each month or get to maximum dose if getting an effective response with minimal side effects.     This medication helps decreases appetite and slows how you absorb sugar into your blood.  It is generally very effective in lowering blood sugar and helping with weight loss. There are some heart and kidney protective benefits long-term.           What we talked about: What I should do:   An issue with your medication    Slowy decrease metFORMIN (GLUCOPHAGE)  take one 500 mg tablet twice a day for 2 weeks, then decrease to one 500 mg tablet once a day until next visit          What we talked about: What I should do:    What my medicines are for, how to know if my medicines are working, made sure my medicines are safe for me and reviewed how to take my medicines.      Take my medicines every day                "

## 2025-03-12 NOTE — LETTER
_  Medication List        Prepared on: Mar 12, 2025     Bring your Medication List when you go to the doctor, hospital, or   emergency room. And, share it with your family or caregivers.     Note any changes to how you take your medications.  Cross out medications when you no longer use them.    Medication How I take it Why I use it Prescriber   albuterol (PROVENTIL HFA;VENTOLIN HFA) 90 mcg/actuation inhaler Inhale 2 puffs into the lungs 4 times daily as needed for shortness of breath. Trouble Breathing Odessa Ocampo MD   cholecalciferol (VITAMIN D3) 125 mcg (5000 units) capsule Take 1 capsule by mouth daily. Vitamin D Deficiency Odessa Ocampo MD   Cyanocobalamin (Vitamin B-12) 1000 MCG TBCR Take 1,000 mcg by mouth daily Vitamin B12 Deficiency  Louise Rivera MD   dicyclomine (BENTYL) 20 MG tablet Take 1 tablet (20 mg) by mouth 4 times daily as needed (Bowel sparms/IBS). Irritable bowel syndrome  Yash Gould MD   famotidine (PEPCID) 40 MG tablet Take 40 mg by mouth nightly as needed for heartburn. Gastroesophageal Reflux Disease; Heartburn Chandrika Knox MD   gabapentin (NEURONTIN) 300 MG capsule Take 600 mg by mouth at bedtime. Anxiety, Sleep CHER Cee CNS   losartan (COZAAR) 50 MG tablet Take 1 tablet (50 mg) by mouth daily High Blood Pressure Louise Rivera MD   meclizine (ANTIVERT) 25 MG tablet Take 1 tablet (25 mg) by mouth 3 times daily as needed for dizziness Vertigo Louise Rivera MD   metFORMIN (GLUCOPHAGE) 500 MG tablet Take 1 tablet (500 mg) by mouth 2 times daily (with meals) for 14 days, THEN 1 tablet (500 mg) daily (with breakfast). Diabetes Odessa Ocampo MD   MOUNJARO 2.5 MG/0.5ML SOAJ Inject 0.5 mLs (2.5 mg) subcutaneously every 7 days. Diabetes, Weight Loss Louise Rivera MD   perphenazine 8 MG tablet Take 8 mg by mouth at bedtime. Mood Disorder CHER Cee CNS   pravastatin (PRAVACHOL) 40 MG tablet Take  40 mg by mouth at bedtime.         High Cholesterol, Reduce Risk of Heart Disease  Odessa Ocampo MD   sertraline (ZOLOFT) 100 MG tablet Take 200 mg by mouth once daily Generalized Anxiety Disorder; Major Depressive Disorder CHER Cee CNS   traZODone (DESYREL) 100 MG tablet Take 100 mg by mouth at bedtime. Trouble Sleeping CHER Cee CNS   vitamin C (ASCORBIC ACID) 500 MG tablet Take 500 mg by mouth daily. General Health Patient Reported   Zinc Acetate, Oral, (ZINC ACETATE PO) Take 1 tablet by mouth daily. General Health Patient Reported         Add new medications, over-the-counter drugs, herbals, vitamins, or  minerals in the blank rows below.    Medication How I take it Why I use it Prescriber                                      Allergies:      - Naltrexone - Anxiety  - Protonix [pantoprazole] - Nausea and Vomiting  - Abilify [aripiprazole] - Other (See Comments)  - Albuterol - Other (See Comments)  - Geodon [ziprasidone] - Other (See Comments)  - Saphris [asenapine] - Unknown  - Demerol [meperidine] - Other (See Comments)  - Naltrexone-bupropion Hcl Er - Anxiety, Palpitations  - Naproxen - Palpitations  - Omeprazole - Palpitations  - Septra [sulfamethoxazole-trimethoprim] - Unknown        Side effects I have had:      Not on File        Other Information:              My notes and questions:

## 2025-03-12 NOTE — PATIENT INSTRUCTIONS
Recommendations from today's MTM visit:                                                    MTM (medication therapy management) is a service provided by a clinical pharmacist designed to help you get the most of out of your medicines.        - Metformin: take one 500 mg tablet twice a day for 2 weeks, then decrease to one 500 mg tablet once a day until next visit    - Start Mounjaro    Week 1-4: Inject 2.5 mg once weekly  Week 5-8: If tolerating, can increase to 5 mg once weekly if necessary  Week 9-12: If tolerating, can increase to 7.5 mg once weekly if necessary  Week 13-16: If tolerating, can increase to 10 mg once weekly if necessary  Week 17-20: If tolerating, can increase to 12.5 mg once weekly if necessary  Week 21 and on: if tolerating, can increase to 15 mg once weekly if necessary    *Important: the goal is to get to a dose that is well tolerated and effective for you. You do not have to go up on the dose each month or get to maximum dose if getting an effective response with minimal side effects.     This medication helps decreases appetite and slows how you absorb sugar into your blood.  It is generally very effective in lowering blood sugar and helping with weight loss. There are some heart and kidney protective benefits long-term.     Some details I like to point out:   - Take once a week, on the same day  - You can take with or without food. Recommend taking before bedtime.  - If you miss a dose, take the missed dose as soon as possible within 3 days (72 hours) after the missed dose. If more than 3 days have passed, skip the missed dose and administer the next dose on the regularly scheduled day.  - Can be injected under the skin of your abdomen, thigh, or upper arm. Most people use their abdomen.   - Keep unused pens in the refrigerator. If needed, each single-dose pen can be stored unrefrigerated at temperatures not to exceed 30 C (86 F) for up to 21 days. I recommend taking pen out of the  "refrigerator 15-30 minutes before giving the injection.   - Common side effects: nausea, diarrhea, constipation, headache, indigestion, tiredness (fatigue), stomach upset or abdominal pain  Theses side effects usually improve after your body gets used to the medicine in a couple weeks. If any side effects become unmanageable, contact the clinic.  If you experience abdominal pain that is severe and will not go away please call your doctor or go to Urgent Care/ Emergency Room.       Diet Tips:  - Eat SLOW- practice eating off of smaller plates/bowls, chewing to applesauce consistency, taking 20-30 minutes to eat in a calm/relaxed environment without distractions of tv/email/cell phone.  - Start with 1/2 of what you normally eat and see how you feel, if still hungry after 20 minutes go back for more  - Avoid high fat/greasy foods  - Eat protein first. Include 15-20 gm protein at each meal, along with protein containing snack of 15-30 gm protein, to reach goal of 60-80 gm protein daily.  - Fill up on fiber. Fiber comes from plants- fruits, veggies, whole grains, nuts/seeds and beans. Aim for 25-35 grams per day by eating fiber with meals and snacks.  - Increase fluids. Drink 64 ounces of 0-calorie drinks between meals.   - Read food labels. Choose foods with less than 10 grams of sugar and 5 grams of fat per serving to prevent excess calories and weight re-gain.   - Try to have a vegetable or fruit with each meal daily.  - Practice plate method: 1/2 plate lean/low fat protein source, vegetable/fruit, less than 25% of plate complex carbohydrates (\"white stuff\"- potatoes, rice, pasta, bread, etc).  - Avoid snacking unless physically hungry.   - Incorporate daily physical activity, 30-60 minutes most days of the week    Follow-up:  Return in 3 weeks (on 4/2/2025) for diabetes medication management, with me, using a phone visit at 2:00 PM .    It was great speaking with you today.  I value your experience and would be very " thankful for your time in providing feedback in our clinic survey. In the next few days, you may receive an email or text message from [facility] [employer] with a link to a survey related to your clinical pharmacist.    To schedule another MTM appointment, please call [phone].    My Clinical Pharmacist's contact information:                                                      Please feel free to contact me with any questions or concerns you have.      Sabrina Weathers, Dorothy, [facility]  Medication Therapy Management Pharmacist  [facility]  [phone]

## 2025-03-12 NOTE — PROGRESS NOTES
Medication Therapy Management (MTM) Encounter    ASSESSMENT:                            Medication Adherence/Access: No issues identified.  _  Diabetes  Weight Management  A1c is at goal at 5.9%. BMI is >30 and she would benefit from GLP-1 therapy along with diet and exercise to help with weight loss goals.   Home glucose readings average ~115 mg/dL, occasionally in 70s mg/dL.  Current weight: 212 lbs. Goal: 170 lbs (15-20% loss over 1 year).  Currently on metformin, which may contribute to diarrhea and bowel urgency.  Patient would benefit from:  - Start Mounjaro 2.5 mg SC weekly for weight loss, sleep apnea, and glucose management.   - Plan to increase to 5 mg after 4 weeks if tolerated  - Taper metformin: 500 mg BID x 2 weeks, then 500 mg daily x 2 weeks, then stop  - Monitor glucose twice daily initially. Does not qualify for CGM coverage due to not using insulin.   - Educated on Mounjaro administration, side effects, and dietary modifications  _  Irritable Bowel Syndrome  Reports diarrhea and urgency, exacerbated by stress.  Metformin may also contribute to her symptoms.   Patient would benefit from:  - Continue dicyclomine PRN for bowel spasms  - Taper off metformin when Mounjaro started as noted above  - Monitor IBS symptoms with Mounjaro initiation  - Advise avoiding trigger foods, especially greasy/fried  _  GERD  Stable.   Reviewed potential for symptom worsening with Mounjaro start.   Continue to use famotidine as needed.  _  Anxiety  Depression  Schizoaffective Disorder  Stable. Follow-up plan in place with psychiatry.   _  Hypertension  BP well-controlled: 124/68 mmHg on current therapy.  _  Hyperlipidemia  Stable.   _  Vertigo  Stable.   _  Supplements  Stable. Last vitamin D and vitamin B-12 levels were in normal range.   _____________________  PLAN:                            - Metformin: take one 500 mg tablet twice a day for 2 weeks, then decrease to one 500 mg tablet once a day until next  visit    - Start Mounjaro    Week 1-4: Inject 2.5 mg once weekly  Week 5-8: If tolerating, can increase to 5 mg once weekly if necessary  Week 9-12: If tolerating, can increase to 7.5 mg once weekly if necessary  Week 13-16: If tolerating, can increase to 10 mg once weekly if necessary  Week 17-20: If tolerating, can increase to 12.5 mg once weekly if necessary  Week 21 and on: if tolerating, can increase to 15 mg once weekly if necessary    *Important: the goal is to get to a dose that is well tolerated and effective for you. You do not have to go up on the dose each month or get to maximum dose if getting an effective response with minimal side effects.     This medication helps decreases appetite and slows how you absorb sugar into your blood.  It is generally very effective in lowering blood sugar and helping with weight loss. There are some heart and kidney protective benefits long-term.     Some details I like to point out:   - Take once a week, on the same day  - You can take with or without food. Recommend taking before bedtime.  - If you miss a dose, take the missed dose as soon as possible within 3 days (72 hours) after the missed dose. If more than 3 days have passed, skip the missed dose and administer the next dose on the regularly scheduled day.  - Can be injected under the skin of your abdomen, thigh, or upper arm. Most people use their abdomen.   - Keep unused pens in the refrigerator. If needed, each single-dose pen can be stored unrefrigerated at temperatures not to exceed 30 C (86 F) for up to 21 days. I recommend taking pen out of the refrigerator 15-30 minutes before giving the injection.   - Common side effects: nausea, diarrhea, constipation, headache, indigestion, tiredness (fatigue), stomach upset or abdominal pain  Theses side effects usually improve after your body gets used to the medicine in a couple weeks. If any side effects become unmanageable, contact the clinic.  If you experience  "abdominal pain that is severe and will not go away please call your doctor or go to Urgent Care/ Emergency Room.       Diet Tips:  - Eat SLOW- practice eating off of smaller plates/bowls, chewing to applesauce consistency, taking 20-30 minutes to eat in a calm/relaxed environment without distractions of tv/email/cell phone.  - Start with 1/2 of what you normally eat and see how you feel, if still hungry after 20 minutes go back for more  - Avoid high fat/greasy foods  - Eat protein first. Include 15-20 gm protein at each meal, along with protein containing snack of 15-30 gm protein, to reach goal of 60-80 gm protein daily.  - Fill up on fiber. Fiber comes from plants- fruits, veggies, whole grains, nuts/seeds and beans. Aim for 25-35 grams per day by eating fiber with meals and snacks.  - Increase fluids. Drink 64 ounces of 0-calorie drinks between meals.   - Read food labels. Choose foods with less than 10 grams of sugar and 5 grams of fat per serving to prevent excess calories and weight re-gain.   - Try to have a vegetable or fruit with each meal daily.  - Practice plate method: 1/2 plate lean/low fat protein source, vegetable/fruit, less than 25% of plate complex carbohydrates (\"white stuff\"- potatoes, rice, pasta, bread, etc).  - Avoid snacking unless physically hungry.   - Incorporate daily physical activity, 30-60 minutes most days of the week        Follow-up: Return in 3 weeks (on 4/2/2025) for diabetes medication management, with me, using a phone visit at 2:00 PM .    SUBJECTIVE/OBJECTIVE:                          Mayra Wheeler is a 64 year old female seen for an initial visit. She was referred to me from Dr. Ocampo.      Reason for visit: Discuss starting GLP-1 medication.    Allergies/ADRs: Reviewed in chart  Past Medical History: Reviewed in chart  Tobacco: She reports that she quit smoking about 28 years ago. Her smoking use included cigarettes. She has never used smokeless tobacco.  Alcohol: " none    Medication Adherence/Access:   No issues reported.  She follows with primary care family medicine and internal medicine providers.     Diabetes  Weight Management  - Metformin 500 mg 2 tablets in the morning, 1 tablet in the evening  - Mounjaro (tirzepatide) - prescribed, not started, brings to visit for education  Side effects: diarrhea, bowel urgency  Last A1c: 5.9%  Blood glucose: typically 115 mg/dL, occasionally 70s mg/dL, usually 2h postprandial  She has some concerns about potential low blood sugars when Mounjaro is started.   Patient has not started Mounjaro due to concerns about IBS and anxiety exacerbation. She has not given herself an injection in the past.   Previously experienced 60lb weight loss with metformin in 2018, regained 20lbs  Today only took 1 tablet of metformin today in anticipation of starting Mounjaro  Physical Activity: Limited by chronic fatigue, Fatigue improving with warmer weather but still limiting physical activity  Previously tried: Contrave, causing severe anxiety  Initial consult weight: 212 lb, BMI- 35.27    Irritable Bowel Syndrome  - Dicyclomine 20 mg as needed  IBS symptoms: primarily diarrhea and bowel urgency, exacerbated by stress  Follows with GI specialist.   Denies consuming fried/fast foods  Identifies chocolate as dietary trigger  Drinks 60oz water daily      GERD  - Famotidine 40 mg at bedtime as needed  No current issues reported.     Anxiety  Depression  Schizoaffective Disorder  - Perphenazine 8 mg daily  - Sertraline 100 mg 2 tablets daily  - Gabapentin 300 mg 2 tablets at bedtime  - Trazodone 100 mg at bedtime  Follows with psychiatry. Current medications and symptoms have been stable.   She is not experiencing significant side effects. She is aware weight gain can be a side effects.   Reports anxiety, especially at night  Experiences stress eating at night    Hypertension  - Losartan 50 mg daily  No issues or side effects reported.  "    Hyperlipidemia  - Pravastatin 40 mg daily  No issues or side effects reported.    Vertigo  - Meclizine 25 mg as needed before dental visits  History of vertigo, uses meclizine before dental appointments    Supplements  - Vitamin B12 1000 mcg daily  - Vitamin C (dosage not specified)  - Vitamin D3 5000 units once daily  - Zinc (dosage not specified)  No issues reported. She takes for general health.     Today's Vitals: /68 (BP Location: Right arm, Patient Position: Sitting, Cuff Size: Adult Large)   Ht 5' 5\" (1.651 m)   Wt 212 lb (96.2 kg)   LMP  (LMP Unknown)   BMI 35.28 kg/m    ----------------      I spent 64 minutes with this patient today. All changes were made via collaborative practice agreement with Odessa Ocampo MD.     A summary of these recommendations was sent via [employer].    Sabrina Weathers, PharmD, BCACP  Medication Therapy Management Pharmacist  [facility]  [phone]           Medication Therapy Recommendations  Diabetes mellitus, type 2 (H)   1 Current Medication: MOUNJARO 2.5 MG/0.5ML SOAJ   Current Medication Sig: Inject 0.5 mLs (2.5 mg) subcutaneously every 7 days.   Rationale: Does not understand instructions - Adherence - Adherence   Recommendation: Provide Education   Status: Patient Agreed - Adherence/Education   Identified Date: 3/12/2025 Completed Date: 3/12/2025         2 Current Medication: metFORMIN (GLUCOPHAGE) 500 MG tablet   Current Medication Sig: Take 1 tablet (500 mg) by mouth daily (with breakfast). 2 tabs in AM, 1 at nighttime.   Rationale: Undesirable effect - Adverse medication event - Safety   Recommendation: Discontinue Medication   Status: Accepted per CPA   Identified Date: 3/12/2025 Completed Date: 3/12/2025            "

## 2025-03-12 NOTE — LETTER
March 17, 2025  Mayra Wheeler  [address]  [place]  [place] [address]    Dear Ms. Wheeler, [facility]     Thank you for talking with me on Mar 12, 2025 about your health and medications. As a follow-up to our conversation, I have included two documents:      Your Recommended To-Do List has steps you should take to get the best results from your medications.  Your Medication List will help you keep track of your medications and how to take them.    If you want to talk about these documents, please call Sabrina Weathers PharmD at phone: [phone], Monday-Friday 8-4:30pm.    I look forward to working with you and your doctors to make sure your medications work well for you.    Sincerely,  Sabrina Weathers, Dorothy  [facility] Pharmacist, [facility]

## 2025-03-17 RX ORDER — FAMOTIDINE 40 MG/1
40 TABLET, FILM COATED ORAL
COMMUNITY

## 2025-03-24 NOTE — TELEPHONE ENCOUNTER
Received the following fax request from [facility] requesting medical records for an appeal.   Per your last message, would you like me to continue with the appeal or disregard.

## 2025-03-25 NOTE — TELEPHONE ENCOUNTER
Medication Appeal Initiation    Medication: FREESTYLE SHANE 2 SENSOR MISC  Appeal Start Date:  3/25/2025  Insurance Company: [employer]  Insurance Phone: [phone]  Insurance Fax: [phone]  Comments:    faxed requested documentation to plan

## 2025-04-01 DIAGNOSIS — E53.8 VITAMIN B12 DEFICIENCY (NON ANEMIC): ICD-10-CM

## 2025-04-02 ENCOUNTER — VIRTUAL VISIT (OUTPATIENT)
Dept: PHARMACY | Facility: PHYSICIAN GROUP | Age: 65
End: 2025-04-02
Payer: COMMERCIAL

## 2025-04-02 DIAGNOSIS — E11.9 TYPE 2 DIABETES MELLITUS WITHOUT COMPLICATION, WITHOUT LONG-TERM CURRENT USE OF INSULIN (H): Primary | ICD-10-CM

## 2025-04-02 DIAGNOSIS — Z78.9 TAKES DIETARY SUPPLEMENTS: ICD-10-CM

## 2025-04-02 PROCEDURE — 99606 MTMS BY PHARM EST 15 MIN: CPT | Mod: 93 | Performed by: PHARMACIST

## 2025-04-02 PROCEDURE — 99607 MTMS BY PHARM ADDL 15 MIN: CPT | Mod: 93 | Performed by: PHARMACIST

## 2025-04-02 NOTE — PROGRESS NOTES
Medication Therapy Management (MTM) Encounter    ASSESSMENT:                            Medication Adherence/Access: No issues identified.  _  Diabetes  Weight Management  A1c is at goal of <7%.   Tolerating Mounjaro start well.   Patient on Mounjaro 2.5 mg weekly.  Reports no significant weight loss yet but looser clothes.  Mild acid reflux managed with famotidine.  No severe GI side effects.   Normal blood glucose readings since stopping metformin.  Patient would benefit from:  - Increase Mounjaro to 5 mg weekly next Wednesday  - Continue famotidine PRN for acid reflux  - Maintain blood glucose monitoring  _  Supplements  No concerns with additional 250 mcg vitamin B12 in probiotic.   _____________________  PLAN:                            - Continue Mounjaro 2.5 mg today. Then, increase Mounjaro to 5 mg once a week next Wednesday.   - Stop metformin  - Continue famotidine as needed for acid reflux  - Okay to start taking Align probiotic gummies.  Suggest stopping if you don't see any change or improvement after 2 months    Follow-up:  - Phone visit on April 30th at 2:00 PM    SUBJECTIVE/OBJECTIVE:                          Mayra Wheeler is a 65 year old female seen for a follow-up visit.       Reason for visit: Medication follow-up.    Allergies/ADRs: Reviewed in chart  Past Medical History: Reviewed in chart  Tobacco: She reports that she quit smoking about 28 years ago. Her smoking use included cigarettes. She has never used smokeless tobacco.  Alcohol: none    Medication Adherence/Access:   No issues reported.  She follows with primary care family medicine and internal medicine providers.     Diabetes  Weight Management  - Mounjaro 2.5 mg weekly (Wednesday evenings)  Taken 3 doses so far, 4th dose tonight.   Discontinued metformin yesterday  Clothes looser despite minimal scale changes  Reduced eating, struggles with nighttime snacking still.   No significant GI side effects, no issues with IBS  Mild acid  reflux, managed with famotidine  Normal blood sugar readings  Rotating injection sites between abdomen and thighs; discomfort with thigh injections  Last A1c: 5.9%    Supplements  - Align probiotic gummies (250 mcg B12/2 gummies)- not started yet  - Vitamin B12 1000 mcg daily  - Vitamin D3 5000 units once daily  - Vitamin C (dosage not specified)  She is considering starting probiotic but noted it has vitamin B-12 in it and wonders if it would be okay to take along with B-12 she is already taking.     Today's Vitals: LMP  (LMP Unknown)   ----------------      I spent 17 minutes with this patient today. All changes were made via collaborative practice agreement with Louise Rivera MD.     A summary of these recommendations was sent via [employer].    Rosa Elena RowlandD, BCACP  Medication Therapy Management Pharmacist  [facility]  [phone]      Telemedicine Visit Details  The patient's medications can be safely assessed via a telemedicine encounter.  Type of service:  Telephone visit  Originating Location (pt. Location): Home    Distant Location (provider location):  On-site  Start Time:  2:01 PM  End Time:  2:18 PM      Medication Therapy Recommendations  Diabetes mellitus, type 2 (H)   1 Current Medication: MOUNJARO 2.5 MG/0.5ML SOAJ (Discontinued)   Current Medication Sig: Inject 0.5 mLs (2.5 mg) subcutaneously every 7 days.   Rationale: Dose too low - Dosage too low - Effectiveness   Recommendation: Increase Dose - Mounjaro 5 MG/0.5ML Soaj   Status: Accepted per CPA   Identified Date: 4/2/2025 Completed Date: 4/2/2025

## 2025-04-02 NOTE — PATIENT INSTRUCTIONS
Recommendations from today's MTM visit:                                                       - Continue Mounjaro 2.5 mg today. Then, increase Mounjaro to 5 mg once a week next Wednesday.   - Stop metformin  - Continue famotidine as needed for acid reflux  - Okay to start taking Align probiotic gummies.  Suggest stopping if you don't see any change or improvement after 2 months    Follow-up:  - Phone visit on April 30th at 2:00 PM    It was great speaking with you today.  I value your experience and would be very thankful for your time in providing feedback in our clinic survey. In the next few days, you may receive an email or text message from [employer] with a link to a survey related to your clinical pharmacist.    To schedule another MTM appointment, please call [phone].    My Clinical Pharmacist's contact information:                                                      Please feel free to contact me with any questions or concerns you have.      Sabrina Weathers, PharmD, BCACP  Medication Therapy Management Pharmacist  [facility]  [phone]

## 2025-04-14 ENCOUNTER — NURSE TRIAGE (OUTPATIENT)
Dept: INTERNAL MEDICINE | Facility: CLINIC | Age: 65
End: 2025-04-14
Payer: COMMERCIAL

## 2025-04-15 ASSESSMENT — ASTHMA QUESTIONNAIRES
QUESTION_3 LAST FOUR WEEKS HOW OFTEN DID YOUR ASTHMA SYMPTOMS (WHEEZING, COUGHING, SHORTNESS OF BREATH, CHEST TIGHTNESS OR PAIN) WAKE YOU UP AT NIGHT OR EARLIER THAN USUAL IN THE MORNING: NOT AT ALL
ACT_TOTALSCORE: 22
QUESTION_1 LAST FOUR WEEKS HOW MUCH OF THE TIME DID YOUR ASTHMA KEEP YOU FROM GETTING AS MUCH DONE AT WORK, SCHOOL OR AT HOME: NONE OF THE TIME
QUESTION_5 LAST FOUR WEEKS HOW WOULD YOU RATE YOUR ASTHMA CONTROL: WELL CONTROLLED
QUESTION_2 LAST FOUR WEEKS HOW OFTEN HAVE YOU HAD SHORTNESS OF BREATH: THREE TO SIX TIMES A WEEK
QUESTION_4 LAST FOUR WEEKS HOW OFTEN HAVE YOU USED YOUR RESCUE INHALER OR NEBULIZER MEDICATION (SUCH AS ALBUTEROL): NOT AT ALL

## 2025-04-15 ASSESSMENT — ANXIETY QUESTIONNAIRES
1. FEELING NERVOUS, ANXIOUS, OR ON EDGE: SEVERAL DAYS
GAD7 TOTAL SCORE: 5
GAD7 TOTAL SCORE: 5
2. NOT BEING ABLE TO STOP OR CONTROL WORRYING: SEVERAL DAYS
4. TROUBLE RELAXING: SEVERAL DAYS
IF YOU CHECKED OFF ANY PROBLEMS ON THIS QUESTIONNAIRE, HOW DIFFICULT HAVE THESE PROBLEMS MADE IT FOR YOU TO DO YOUR WORK, TAKE CARE OF THINGS AT HOME, OR GET ALONG WITH OTHER PEOPLE: SOMEWHAT DIFFICULT
7. FEELING AFRAID AS IF SOMETHING AWFUL MIGHT HAPPEN: NOT AT ALL
5. BEING SO RESTLESS THAT IT IS HARD TO SIT STILL: NOT AT ALL
8. IF YOU CHECKED OFF ANY PROBLEMS, HOW DIFFICULT HAVE THESE MADE IT FOR YOU TO DO YOUR WORK, TAKE CARE OF THINGS AT HOME, OR GET ALONG WITH OTHER PEOPLE?: SOMEWHAT DIFFICULT
6. BECOMING EASILY ANNOYED OR IRRITABLE: SEVERAL DAYS
7. FEELING AFRAID AS IF SOMETHING AWFUL MIGHT HAPPEN: NOT AT ALL
3. WORRYING TOO MUCH ABOUT DIFFERENT THINGS: SEVERAL DAYS
GAD7 TOTAL SCORE: 5

## 2025-04-15 ASSESSMENT — PATIENT HEALTH QUESTIONNAIRE - PHQ9
SUM OF ALL RESPONSES TO PHQ QUESTIONS 1-9: 3
SUM OF ALL RESPONSES TO PHQ QUESTIONS 1-9: 3
10. IF YOU CHECKED OFF ANY PROBLEMS, HOW DIFFICULT HAVE THESE PROBLEMS MADE IT FOR YOU TO DO YOUR WORK, TAKE CARE OF THINGS AT HOME, OR GET ALONG WITH OTHER PEOPLE: VERY DIFFICULT

## 2025-04-16 ENCOUNTER — OFFICE VISIT (OUTPATIENT)
Dept: INTERNAL MEDICINE | Facility: CLINIC | Age: 65
End: 2025-04-16
Payer: COMMERCIAL

## 2025-04-16 VITALS
RESPIRATION RATE: 12 BRPM | SYSTOLIC BLOOD PRESSURE: 136 MMHG | DIASTOLIC BLOOD PRESSURE: 64 MMHG | HEIGHT: 65 IN | TEMPERATURE: 98.2 F | WEIGHT: 214.9 LBS | HEART RATE: 70 BPM | BODY MASS INDEX: 35.81 KG/M2 | OXYGEN SATURATION: 94 %

## 2025-04-16 DIAGNOSIS — F51.01 PRIMARY INSOMNIA: Primary | ICD-10-CM

## 2025-04-16 DIAGNOSIS — G47.33 OSA (OBSTRUCTIVE SLEEP APNEA): ICD-10-CM

## 2025-04-16 DIAGNOSIS — I10 BENIGN ESSENTIAL HYPERTENSION: ICD-10-CM

## 2025-04-16 DIAGNOSIS — E11.9 TYPE 2 DIABETES MELLITUS WITHOUT COMPLICATION, WITHOUT LONG-TERM CURRENT USE OF INSULIN (H): ICD-10-CM

## 2025-04-16 DIAGNOSIS — F33.41 RECURRENT MAJOR DEPRESSIVE DISORDER, IN PARTIAL REMISSION: ICD-10-CM

## 2025-04-16 DIAGNOSIS — F41.9 ANXIETY: ICD-10-CM

## 2025-04-16 DIAGNOSIS — R53.82 CHRONIC FATIGUE: ICD-10-CM

## 2025-04-16 DIAGNOSIS — G25.81 RESTLESS LEGS SYNDROME (RLS): ICD-10-CM

## 2025-04-16 DIAGNOSIS — K59.03 DRUG-INDUCED CONSTIPATION: ICD-10-CM

## 2025-04-16 PROCEDURE — 1126F AMNT PAIN NOTED NONE PRSNT: CPT | Performed by: INTERNAL MEDICINE

## 2025-04-16 PROCEDURE — G2211 COMPLEX E/M VISIT ADD ON: HCPCS | Performed by: INTERNAL MEDICINE

## 2025-04-16 PROCEDURE — 3078F DIAST BP <80 MM HG: CPT | Performed by: INTERNAL MEDICINE

## 2025-04-16 PROCEDURE — 99214 OFFICE O/P EST MOD 30 MIN: CPT | Performed by: INTERNAL MEDICINE

## 2025-04-16 PROCEDURE — 3075F SYST BP GE 130 - 139MM HG: CPT | Performed by: INTERNAL MEDICINE

## 2025-04-16 RX ORDER — MAGNESIUM CARB/ALUMINUM HYDROX 105-160MG
296 TABLET,CHEWABLE ORAL ONCE
Qty: 296 ML | Refills: 0 | Status: SHIPPED | OUTPATIENT
Start: 2025-04-16 | End: 2025-04-16

## 2025-04-16 RX ORDER — METOPROLOL SUCCINATE 25 MG/1
25 TABLET, EXTENDED RELEASE ORAL DAILY
COMMUNITY
Start: 2025-04-15

## 2025-04-16 ASSESSMENT — PAIN SCALES - GENERAL: PAINLEVEL_OUTOF10: NO PAIN (0)

## 2025-04-16 NOTE — PROGRESS NOTES
Assessment & Plan   Problem List Items Addressed This Visit          Respiratory    VONDA (obstructive sleep apnea)     Initially diagnosed 6 years ago in the setting of her new onset fatigue.  Had used a CPAP but was not sure of benefit so stopped.  We again discussed the importance of getting a repeat sleep study as untreated sleep apnea could certainly be contributing to her chronic fatigue and daytime sleepiness. Logistics of picking up HST are an issue.   - Will revisit if still having sleep issues as this may be contributing to insomnia and fatigue            Digestive    Drug-induced constipation     2/2 mounjaro.   - Instructed her first do mag citrate x1 to clean out given her discomfort today  - Then increase miralax to daily, can even go to BID  - Increase water intake  - Goal to move body every day as well             Endocrine    Diabetes mellitus, type 2 (H)     Just increased mounjaro to 5 mg daily dose last week. Now off metformin.   - Continue current dose of mounjaro  - Discussed if still struggling with constipation, okay to keep dose at 5 mg longer than 4 weeks             Circulatory    Benign essential hypertension     Well controlled on current regimen today  - Continue losartan 50 mg daily             Musculoskeletal and Integumentary    Restless legs syndrome (RLS)     Better with compression stockings. Ferritin 71 (2/2025), okay without iron supplement.            Behavioral    Depression     Also follows with psychiatry for this.  Current regimen Zoloft 200 mg daily.            Other    Anxiety     Also follows with psychiatry for this.  In addition to Zoloft 200 mg daily, regimen includes gabapentin 600 mg at bedtime and trazodone 100 mg at bedtime.    -Defer management to her psychiatrist         Chronic fatigue     C/w CFS. Energy improved slightly with mounjaro.   - Continue mounjaro  - Continue graded exercise          Primary insomnia - Primary     Current regimen is trazodone 100 mg  at bedtime. Still having trouble falling asleep 3-4 days a week. Working on sleep hygiene by reading in bed instead of screens.   - We discussed importance of same bed time and same time awake every day, even days she has a hard time falling asleep  - Can trial melatonin 3-5 mg at bedtime and/or benadryl 12.5-25 mg at bedtime   - Continue current trazodone dose  - Would avoid Z drugs   - F/up in June as scheduled, still need to work on getting a HST as well            The longitudinal plan of care for the diagnosis(es)/condition(s) as documented were addressed during this visit. Due to the added complexity in care, I will continue to support Mayra in the subsequent management and with ongoing continuity of care.     Follow up in June as scheduled    Subjective   Mayra is a 65 year old, presenting for the following health issues:  Sleep Problem (Ongoing for a while. Trouble falling asleep three night a week. ) and Medication Problem (Patient would like to discuss Mounjaro. )        4/16/2025     4:09 PM   Additional Questions   Roomed by Adolph Xavier   Accompanied by Self     HPI      Here for follow up and mainly wants to discuss insomnia. Also follows with psychiatrist. Current regimen is trazodone 100 mg at bedtime but still having trouble.   - 3 nights away can't fall asleep until 4-5 AM. Falls asleep and wakes up at 1pm in the afternoon  - Falling asleep is the issue. Once asleep, does stay asleep.     T2DM: Planned to start mounjaro in addition to metformin at our last visit.   - MTM 3/12/25: start mounjaro, f/up 3 wks.   - F/up 4/2/25, cont mounjaro 2.5 mg, stop metformin.   - Increased to 5 mg last week, will take second dose today   - Has noticed decreased appetite but significant constipation that is making her uncomfortable  - Does note improved energy since starting  - No weight loss yet   - Taking miralax every other night, not cutting it   - 60 oz of water daily     She also continues to see a provider  "at [facility] when she can't see me. Tells me that they recnetly did a Holter monitor for palpitations that showed \"benign beats\". That provider wanted her to start metoprolol XL 25 mg daily.       Review of Systems  Constitutional, neuro, ENT, endocrine, pulmonary, cardiac, gastrointestinal, genitourinary, musculoskeletal, integument and psychiatric systems are negative, except as otherwise noted.      Objective    /64   Pulse 70   Temp 98.2  F (36.8  C) (Oral)   Resp 12   Ht 1.651 m (5' 5\")   Wt 97.5 kg (214 lb 14.4 oz)   LMP  (LMP Unknown)   SpO2 94%   BMI 35.76 kg/m    Body mass index is 35.76 kg/m .  Physical Exam   GENERAL: healthy, alert and no distress  EYES: Eyes grossly normal to inspection and conjunctivae and sclerae normal  HENT: nose and mouth without ulcers or lesions  RESP: breathing comfortably and speaking in full sentences on room air with no respiratory distress or coughing  CV: warm and well perfused  SKIN: no suspicious lesions or rashes on exposed skin  NEURO: No focal deficits, mentation intact and speech normal  PSYCH: mentation appears normal, affect normal/bright            Signed Electronically by: Louise Rivera MD    "

## 2025-04-16 NOTE — PATIENT INSTRUCTIONS
Increase miralax to every day.     Start melatonin 3-5 mg 1 hour prior to bedtime. Can also try 12.5-25 mg of benadryl.

## 2025-04-17 PROBLEM — F51.01 PRIMARY INSOMNIA: Status: ACTIVE | Noted: 2025-04-17

## 2025-04-17 PROBLEM — K59.03 DRUG-INDUCED CONSTIPATION: Status: ACTIVE | Noted: 2025-04-17

## 2025-04-17 NOTE — ASSESSMENT & PLAN NOTE
C/w CFS. Energy improved slightly with mounjaro.   - Continue mounjaro  - Continue graded exercise

## 2025-04-17 NOTE — ASSESSMENT & PLAN NOTE
Initially diagnosed 6 years ago in the setting of her new onset fatigue.  Had used a CPAP but was not sure of benefit so stopped.  We again discussed the importance of getting a repeat sleep study as untreated sleep apnea could certainly be contributing to her chronic fatigue and daytime sleepiness. Logistics of picking up HST are an issue.   - Will revisit if still having sleep issues as this may be contributing to insomnia and fatigue

## 2025-04-17 NOTE — ASSESSMENT & PLAN NOTE
2/2 mounjaro.   - Instructed her first do mag citrate x1 to clean out given her discomfort today  - Then increase miralax to daily, can even go to BID  - Increase water intake  - Goal to move body every day as well

## 2025-04-17 NOTE — ASSESSMENT & PLAN NOTE
Current regimen is trazodone 100 mg at bedtime. Still having trouble falling asleep 3-4 days a week. Working on sleep hygiene by reading in bed instead of screens.   - We discussed importance of same bed time and same time awake every day, even days she has a hard time falling asleep  - Can trial melatonin 3-5 mg at bedtime and/or benadryl 12.5-25 mg at bedtime   - Continue current trazodone dose  - Would avoid Z drugs   - F/up in June as scheduled, still need to work on getting a HST as well

## 2025-04-17 NOTE — ASSESSMENT & PLAN NOTE
Just increased mounjaro to 5 mg daily dose last week. Now off metformin.   - Continue current dose of mounjaro  - Discussed if still struggling with constipation, okay to keep dose at 5 mg longer than 4 weeks

## 2025-04-23 DIAGNOSIS — R42 VERTIGO: ICD-10-CM

## 2025-04-23 RX ORDER — MECLIZINE HYDROCHLORIDE 25 MG/1
25 TABLET ORAL 3 TIMES DAILY PRN
Qty: 30 TABLET | Refills: 1 | Status: SHIPPED | OUTPATIENT
Start: 2025-04-23

## 2025-04-30 ENCOUNTER — VIRTUAL VISIT (OUTPATIENT)
Dept: PHARMACY | Facility: PHYSICIAN GROUP | Age: 65
End: 2025-04-30
Payer: COMMERCIAL

## 2025-04-30 DIAGNOSIS — E11.9 TYPE 2 DIABETES MELLITUS WITHOUT COMPLICATION, WITHOUT LONG-TERM CURRENT USE OF INSULIN (H): Primary | ICD-10-CM

## 2025-04-30 PROCEDURE — 99606 MTMS BY PHARM EST 15 MIN: CPT | Mod: 93 | Performed by: PHARMACIST

## 2025-04-30 NOTE — PROGRESS NOTES
Medication Therapy Management (MTM) Encounter    ASSESSMENT:                            Medication Adherence/Access: No issues identified.  _  Diabetes  Weight Management  A1c is at goal of <7%.  Tolerating current Mounjaro well with minor GI side effects.   Reports minor weight loss and increased appetite suppression.  Implemented dietary changes including daily salads and protein shakes. Sugary foods cause upset.  Patient would benefit from:  - Continue Mounjaro 5 mg for 2+ months prior to increasing further  - Increase protein intake: add meats to salads  - Drink water post-meals for nausea, eat small frequent meals  - Continue fiber and probiotic supplements  Discussed dietary changes, side effect management, and expectations for weight loss.  _  Gastrointestinal Symptoms  Previous constipation resolved with fiber and probiotic gummies. No diarrhea. Nausea post-meals, likely Mounjaro side effect.  Continue Fiber Choice and probiotic gummies  Maintain hydration for bowel regularity  Manage nausea: drink water post-meals, small frequent meals, avoid trigger foods (e.g., sugary items)  _____________________  PLAN:                            Medications:   - Continue Mounjaro 5 mg dose for at least the next 2 months.  - Okay to continue Fiber Choice and probiotic supplements    Diet and Nutrition:    - Add more protein to salads  and snacks    - Drink protein shakes daily    - Avoid sugary foods that induce nausea    - Eat small amounts frequently to avoid nausea    - Drink water (even half a glass) when feeling nauseous after eating    Follow-Up:   - Next MTM appointment scheduled for June 18th at 2:00 PM  - Dr. Rivera appointment on June 25th for A1c check    SUBJECTIVE/OBJECTIVE:                          Mayra Wheeler is a 65 year old female seen for a follow-up visit.       Reason for visit: Medication follow-up.    Allergies/ADRs: Reviewed in chart  Past Medical History: Reviewed in chart  Tobacco: She  reports that she quit smoking about 28 years ago. Her smoking use included cigarettes. She has never used smokeless tobacco.  Alcohol: none    Medication Adherence/Access:   No issues reported.  She follows with primary care family medicine and internal medicine providers.     Diabetes  Weight Management  - Mounjaro (tirzepatide) 5 mg weekly injection  Patient on 5 mg dose for 4 weeks  Reports minimal weight loss, but has lost a few pounds  Appetite suppression noted. Constipation has resolved.   GI upset: stomach discomfort, nausea post-meals- mostly with sugary foods  Not snacking as much at night which has been an issue for her.   Recent A1c: 5.9%  Reports good blood sugar readings  Diet:  Daily salads with protein (ham, cheese, eggs)  Protein shakes  Avoiding sugary foods  Trying to increase protein intake    Constipation  GI Issues  - Fiber Choice 2 chew tablets daily  - Align Probiotic gummies daily  Constipation has improved with adding fiber. She has increased salad intake. Trying to drink more water.     Today's Vitals: LMP  (LMP Unknown)   ----------------      I spent 19 minutes with this patient today. All changes were made via collaborative practice agreement with Louise Rivera MD.     A summary of these recommendations was sent via [employer].    Sabrina Weathers, PharmD, BCACP  Medication Therapy Management Pharmacist  [facility]  [phone]      Telemedicine Visit Details  The patient's medications can be safely assessed via a telemedicine encounter.  Type of service:  Telephone visit  Originating Location (pt. Location): Home    Distant Location (provider location):  On-site  Start Time:  2:01 PM  End Time:  2:20 PM      Medication Therapy Recommendations  No medication therapy recommendations to display

## 2025-04-30 NOTE — PATIENT INSTRUCTIONS
Recommendations from today's MTM visit:                                                         Medications:   - Continue Mounjaro 5 mg dose for at least the next 2 months.  - Okay to continue Fiber Choice and probiotic supplements    Diet and Nutrition:    - Add more protein to salads  and snacks    - Drink protein shakes daily    - Avoid sugary foods that induce nausea    - Eat small amounts frequently to avoid nausea    - Drink water (even half a glass) when feeling nauseous after eating    Follow-Up:   - Next MTM appointment scheduled for June 18th at 2:00 PM  - Dr. Rivera appointment on June 25th for A1c check    It was great speaking with you today.  I value your experience and would be very thankful for your time in providing feedback in our clinic survey. In the next few days, you may receive an email or text message from [employer] with a link to a survey related to your clinical pharmacist.    To schedule another MTM appointment, please call [phone].    My Clinical Pharmacist's contact information:                                                      Please feel free to contact me with any questions or concerns you have.      Sabrina Weathers, PharmD, BCACP  Medication Therapy Management Pharmacist  [facility]  [phone]

## 2025-05-01 DIAGNOSIS — E11.9 TYPE 2 DIABETES MELLITUS WITHOUT COMPLICATION, WITHOUT LONG-TERM CURRENT USE OF INSULIN (H): ICD-10-CM

## 2025-05-01 NOTE — TELEPHONE ENCOUNTER
Medication Question or Refill        What medication are you calling about (include dose and sig)?:    Disp Refills Start End RADHA   tirzepatide (MOUNJARO) 5 MG/0.5ML SOAJ auto-injector pen 2 mL 2 4/2/2025 -- No   Sig - Route: Inject 0.5 mLs (5 mg) subcutaneously every 7 days. - Subcutaneous   Sent to pharmacy as: Tirzepatide 5 MG/0.5ML Subcutaneous Solution Auto-injector (MOUNJARO)   Class: E-Prescribe   Order: [id number]   E-Prescribing Status: Receipt confirmed by pharmacy (4/2/2025  2:35 PM CDT)       Preferred Pharmacy    [facility]/pharmacy #[id number] - [place], [place] - [address] [place] [address] E  [address] [place] [address] E  [facility] [address]  Phone: [phone] Fax: [phone]      Controlled Substance Agreement on file:   CSA -- Patient Level:    CSA: None found at the patient level.       Who prescribed the medication?: PCP    Do you need a refill? Yes    Contact Information  [phone] (Mobile)

## 2025-05-01 NOTE — TELEPHONE ENCOUNTER
4/30/25 MTM visit note    Diabetes  Weight Management  A1c is at goal of <7%.  Tolerating current Mounjaro well with minor GI side effects.   Reports minor weight loss and increased appetite suppression.  Implemented dietary changes including daily salads and protein shakes. Sugary foods cause upset.  Patient would benefit from:  - Continue Mounjaro 5 mg for 2+ months prior to increasing further

## 2025-05-08 ENCOUNTER — VIRTUAL VISIT (OUTPATIENT)
Dept: SLEEP MEDICINE | Facility: CLINIC | Age: 65
End: 2025-05-08
Attending: INTERNAL MEDICINE
Payer: COMMERCIAL

## 2025-05-08 DIAGNOSIS — F51.04 CHRONIC INSOMNIA: Primary | ICD-10-CM

## 2025-05-08 DIAGNOSIS — Z91.199 FAILURE TO ATTEND APPOINTMENT: ICD-10-CM

## 2025-05-08 DIAGNOSIS — G25.81 RESTLESS LEGS SYNDROME (RLS): ICD-10-CM

## 2025-05-08 DIAGNOSIS — G47.21 CIRCADIAN RHYTHM SLEEP DISORDER, DELAYED SLEEP PHASE TYPE: ICD-10-CM

## 2025-05-08 DIAGNOSIS — G47.33 OSA (OBSTRUCTIVE SLEEP APNEA): ICD-10-CM

## 2025-05-08 NOTE — Clinical Note
FYI, patient was a no show.  Based on chart review is a poor candidate for CBTI notingsigniticant co occurring sleep disprders, substantial mental health conditions including schizoaffective disorder.  Harshad

## 2025-05-22 DIAGNOSIS — K59.03 DRUG-INDUCED CONSTIPATION: ICD-10-CM

## 2025-05-22 NOTE — TELEPHONE ENCOUNTER
FAX [facility] Refill Request    FT Magnesium Citrate Solution   [facility]

## 2025-05-27 ENCOUNTER — TRANSFERRED RECORDS (OUTPATIENT)
Dept: MULTI SPECIALTY CLINIC | Facility: CLINIC | Age: 65
End: 2025-05-27
Payer: COMMERCIAL

## 2025-05-27 LAB — RETINOPATHY: NORMAL

## 2025-05-27 RX ORDER — MAGNESIUM CARB/ALUMINUM HYDROX 105-160MG
296 TABLET,CHEWABLE ORAL ONCE
Qty: 296 ML | Refills: 0 | Status: SHIPPED | OUTPATIENT
Start: 2025-05-27 | End: 2025-05-27

## 2025-06-18 ENCOUNTER — VIRTUAL VISIT (OUTPATIENT)
Dept: PHARMACY | Facility: PHYSICIAN GROUP | Age: 65
End: 2025-06-18
Payer: COMMERCIAL

## 2025-06-18 DIAGNOSIS — K58.2 IRRITABLE BOWEL SYNDROME WITH BOTH CONSTIPATION AND DIARRHEA: ICD-10-CM

## 2025-06-18 DIAGNOSIS — E11.9 TYPE 2 DIABETES MELLITUS WITHOUT COMPLICATION, WITHOUT LONG-TERM CURRENT USE OF INSULIN (H): Primary | ICD-10-CM

## 2025-06-18 PROCEDURE — 99607 MTMS BY PHARM ADDL 15 MIN: CPT | Mod: 93 | Performed by: PHARMACIST

## 2025-06-18 PROCEDURE — 99606 MTMS BY PHARM EST 15 MIN: CPT | Mod: 93 | Performed by: PHARMACIST

## 2025-06-18 RX ORDER — AMOXICILLIN 250 MG
1-2 CAPSULE ORAL DAILY PRN
COMMUNITY

## 2025-06-18 RX ORDER — WHEAT DEXTRIN 3 G/3.8 G
2 POWDER (GRAM) ORAL DAILY
COMMUNITY

## 2025-06-18 RX ORDER — MAGNESIUM CARB/ALUMINUM HYDROX 105-160MG
296 TABLET,CHEWABLE ORAL PRN
COMMUNITY
End: 2025-06-25

## 2025-06-18 NOTE — PROGRESS NOTES
Medication Therapy Management (MTM) Encounter    ASSESSMENT:                            Medication Adherence/Access  No issues identified.   _  Diabetes  Weight Management  No weight loss on Mounjaro 5 mg since April 9th (2 months). Reduced daytime snacking but nighttime food cravings persist. No significant nausea or GI upset except constipation- see below.   Patient would benefit from:  - Continue Mounjaro 5 mg for 1 more week  - Increase to 7.5 mg weekly, pending Dr. Rivera's approval  - Start new dose (if approved) on July 2nd  - Continue current dietary habits  - Add fiber powder during low vegetable intake weeks  _  Chronic Constipation  Managed with magnesium citrate weekly, MiraLax and senna PRN. Adequate hydration and increased vegetable intake helpful. Using probiotic gummy.  Patient would benefit from:  - Ok to continue magnesium citrate weekly as needed. Added to medication list for refills.   - Send senna Rx to pharmacy to see if covered  - Maintain hydration, aim for 64 ounces water/day  - Continue high-fiber vegetable intake  - Use fiber powder during low vegetable intake weeks  _____________________   PLAN:                            - Continue Mounjaro 5 mg for one more week  - Discuss plan to increase to Mounjaro 7.5 mg after next doctor visit  - Continue magnesium citrate once a week as needed for constipation  - Take senna 8.6 mg 1-2 tablets daily as needed for constipation  - Continue using probiotic gummies  - Drink at least 40 ounces of water daily, aim for 64 ounces if able  - Eat fiber-rich vegetables like cauliflower, broccoli, radishes, and carrots  - Consider using Benefiber powder if not getting enough fiber from food  - If nausea occurs with new Mounjaro dose, drink a small glass of water    Follow-up:   - Appointment with Dr. Rivera on Wednesday, June 25, 2025  - Phone call with MT pharmacist on July 23, 2025    SUBJECTIVE/OBJECTIVE:                          Mayra Wheeler is a  65 year old female seen for a follow-up visit.       Reason for visit: Medication follow-up.    Allergies/ADRs: Reviewed in chart  Past Medical History: Reviewed in chart  Tobacco: She reports that she quit smoking about 28 years ago. Her smoking use included cigarettes. She has never used smokeless tobacco.  Alcohol: none    Medication Adherence/Access: no issues reported.    Diabetes  Weight Management  - Mounjaro (tirzepatide) 5 mg weekly   She has been on current dose for about 2 months  Frustrated with inability to lose weight on current regimen  Previously lost 60 lbs when initially started on metformin  Difficult to differentiate medication effects from chronic fatigue symptoms  Denies nausea  Reduced daytime snacking, but night food cravings persist    Constipation  - Magnesium citrate ~1x/week  - MiraLax PRN (unsure of appropriate dosing)  - Senna 8.6 mg PRN (unsure of appropriate dosing)  - Fiber Choice 2 chew tablets daily   - Align Probiotic gummies daily   She does have constipation, managed with above medications  Hydration: 40 oz water daily  Diet: High-fiber vegetables (cauliflower, broccoli, radishes, carrots)    Today's Vitals: LMP  (LMP Unknown)   ----------------      I spent 18 minutes with this patient today. All changes were made via collaborative practice agreement with Louise Rivera MD.     A summary of these recommendations was sent via [employer].    Sabrina Weathers, PharmD, BCACP  Medication Therapy Management Pharmacist  [facility]  [phone]      Telemedicine Visit Details  The patient's medications can be safely assessed via a telemedicine encounter.  Type of service:  Telephone visit  Originating Location (pt. Location): Home    Distant Location (provider location):  On-site  Start Time: 1:59 PM  End Time: 2:17 PM     Medication Therapy Recommendations  Diabetes mellitus, type 2 (H)   1 Current Medication: tirzepatide (MOUNJARO) 5 MG/0.5ML SOAJ auto-injector  pen   Current Medication Sig: Inject 0.5 mLs (5 mg) subcutaneously every 7 days.   Rationale: Dose too low - Dosage too low - Effectiveness   Recommendation: Increase Dose - Tirzepatide 7.5 MG/0.5ML Soaj auto-injector pen   Status: Contact Provider - Awaiting Response   Identified Date: 6/18/2025         IBS (irritable bowel syndrome)   1 Current Medication: senna-docusate (SENOKOT-S/PERICOLACE) 8.6-50 MG tablet   Current Medication Sig: Take 1-2 tablets by mouth daily as needed for constipation.   Rationale: Dose too low - Dosage too low - Effectiveness   Recommendation: Increase Frequency   Status: Accepted - no CPA Needed   Identified Date: 6/18/2025 Completed Date: 6/18/2025         2 Rationale: Synergistic therapy - Needs additional medication therapy - Indication   Recommendation: Start Medication - BENEFIBER DRINK MIX PO   Status: Accepted - no CPA Needed   Identified Date: 6/18/2025 Completed Date: 6/18/2025

## 2025-06-24 NOTE — PATIENT INSTRUCTIONS
Recommendations from today's MTM visit:                                                       - Continue Mounjaro 5 mg for one more week  - Discuss plan to increase to Mounjaro 7.5 mg after next doctor visit  - Continue magnesium citrate once a week as needed for constipation  - Take senna 8.6 mg 1-2 tablets daily as needed for constipation  - Continue using probiotic gummies  - Drink at least 40 ounces of water daily, aim for 64 ounces if able  - Eat fiber-rich vegetables like cauliflower, broccoli, radishes, and carrots  - Consider using Benefiber powder if not getting enough fiber from food  - If nausea occurs with new Mounjaro dose, drink a small glass of water    Follow-up:   - Appointment with Dr. Rivera on Wednesday, June 25, 2025  - Phone call with MTM pharmacist on July 23, 2025    It was great speaking with you today.  I value your experience and would be very thankful for your time in providing feedback in our clinic survey. In the next few days, you may receive an email or text message from [employer] with a link to a survey related to your clinical pharmacist.    To schedule another MTM appointment, please call [phone].    My Clinical Pharmacist's contact information:                                                      Please feel free to contact me with any questions or concerns you have.      Sabrina Weathers, PharmD, BCACP  Medication Therapy Management Pharmacist  [facility]  [phone]

## 2025-06-25 ENCOUNTER — HOSPITAL ENCOUNTER (OUTPATIENT)
Dept: GENERAL RADIOLOGY | Facility: HOSPITAL | Age: 65
Discharge: HOME | End: 2025-06-25
Attending: INTERNAL MEDICINE
Payer: COMMERCIAL

## 2025-06-25 ENCOUNTER — OFFICE VISIT (OUTPATIENT)
Dept: INTERNAL MEDICINE | Facility: CLINIC | Age: 65
End: 2025-06-25
Payer: COMMERCIAL

## 2025-06-25 ENCOUNTER — RESULTS FOLLOW-UP (OUTPATIENT)
Dept: INTERNAL MEDICINE | Facility: CLINIC | Age: 65
End: 2025-06-25

## 2025-06-25 VITALS
BODY MASS INDEX: 36 KG/M2 | HEIGHT: 65 IN | DIASTOLIC BLOOD PRESSURE: 60 MMHG | SYSTOLIC BLOOD PRESSURE: 122 MMHG | OXYGEN SATURATION: 97 % | HEART RATE: 71 BPM | RESPIRATION RATE: 12 BRPM | WEIGHT: 216.1 LBS | TEMPERATURE: 98.2 F

## 2025-06-25 DIAGNOSIS — M25.551 HIP PAIN, RIGHT: ICD-10-CM

## 2025-06-25 DIAGNOSIS — R53.82 CHRONIC FATIGUE: ICD-10-CM

## 2025-06-25 DIAGNOSIS — F25.9 SCHIZOAFFECTIVE DISORDER, UNSPECIFIED TYPE (H): ICD-10-CM

## 2025-06-25 DIAGNOSIS — G25.81 RESTLESS LEGS SYNDROME (RLS): ICD-10-CM

## 2025-06-25 DIAGNOSIS — E78.2 MIXED HYPERLIPIDEMIA: ICD-10-CM

## 2025-06-25 DIAGNOSIS — E11.9 TYPE 2 DIABETES MELLITUS WITHOUT COMPLICATION, WITHOUT LONG-TERM CURRENT USE OF INSULIN (H): Primary | ICD-10-CM

## 2025-06-25 DIAGNOSIS — K59.03 DRUG-INDUCED CONSTIPATION: ICD-10-CM

## 2025-06-25 DIAGNOSIS — M25.551 HIP PAIN, RIGHT: Primary | ICD-10-CM

## 2025-06-25 DIAGNOSIS — I10 BENIGN ESSENTIAL HYPERTENSION: ICD-10-CM

## 2025-06-25 LAB
EST. AVERAGE GLUCOSE BLD GHB EST-MCNC: 114 MG/DL
HBA1C MFR BLD: 5.6 % (ref 0–5.6)

## 2025-06-25 PROCEDURE — 72170 X-RAY EXAM OF PELVIS: CPT

## 2025-06-25 PROCEDURE — G2211 COMPLEX E/M VISIT ADD ON: HCPCS | Performed by: INTERNAL MEDICINE

## 2025-06-25 PROCEDURE — 99214 OFFICE O/P EST MOD 30 MIN: CPT | Performed by: INTERNAL MEDICINE

## 2025-06-25 PROCEDURE — 3078F DIAST BP <80 MM HG: CPT | Performed by: INTERNAL MEDICINE

## 2025-06-25 PROCEDURE — 36415 COLL VENOUS BLD VENIPUNCTURE: CPT | Performed by: INTERNAL MEDICINE

## 2025-06-25 PROCEDURE — 83036 HEMOGLOBIN GLYCOSYLATED A1C: CPT | Performed by: INTERNAL MEDICINE

## 2025-06-25 PROCEDURE — 3074F SYST BP LT 130 MM HG: CPT | Performed by: INTERNAL MEDICINE

## 2025-06-25 RX ORDER — MAGNESIUM CARB/ALUMINUM HYDROX 105-160MG
296 TABLET,CHEWABLE ORAL PRN
Qty: 1187 ML | Refills: 11 | Status: SHIPPED | OUTPATIENT
Start: 2025-06-25

## 2025-06-25 NOTE — ASSESSMENT & PLAN NOTE
Currently doing very well with Mounjaro 5 mg weekly.  Was struggling with constipation, but this is better with use of magnesium separate.  Has not had weight loss but has had very positive effects with Mounjaro thus far with increased energy, decreased joint pains.  Notes that she has good suppression of food noise in the morning but gets more hungry in the evening time.  -Increase Mounjaro to 7.5 mg weekly, discussed we can increase this by 2.5 mg every 4 weeks  -Has follow-up with MTM pharmacist in 4 weeks, pending that discussion will decide on next month's dose  -Repeat A1c today  -Follow-up 3 months

## 2025-06-25 NOTE — ASSESSMENT & PLAN NOTE
Has had onset of right lateral hip pain in the last few weeks.  Is tender to palpation right over the trochanteric bursa.  Discussed this may be consistent with trochanteric bursitis versus some arthritis of the hip versus muscular strain.  -Will get x-ray today to evaluate the joint  -Provided exercises/stretches for trochanteric bursitis in AVS  -Orthopedics referral placed if pain does not improve with these exercises

## 2025-06-25 NOTE — ASSESSMENT & PLAN NOTE
Follows with CHER Alarcon at [facility] in [place]   -Continue perphenazine 8 mg at bedtime, will leave management to her psychiatric provider

## 2025-06-25 NOTE — PATIENT INSTRUCTIONS
Increase mounjaro to 7.5 mg weekly. Let em know how things are going after 3 weeks. Can increase the next month if needed.

## 2025-06-25 NOTE — ASSESSMENT & PLAN NOTE
2/2 mounjaro. Feels great relief with mag citrate one bottle weekly. Discussed adding fiber, senna +/- miralax. She really feels best with the magnesium citrate.   - Okay to continue magnesium citrate weekly PRN, refills provided today  - Really work to increase fluid intake, goal 40-60 oz daily  - Continue to work on increased walking

## 2025-06-25 NOTE — ASSESSMENT & PLAN NOTE
C/w CFS.  Energy continues to be improved with Mounjaro.  Discussed with her that even if the weight is stable, this improvement in energy is a great success and we should be proud of this.  -Continue Mounjaro  -Continue working to increase her exercise and walking as she is doing

## 2025-06-25 NOTE — PROGRESS NOTES
Assessment & Plan   Problem List Items Addressed This Visit          Cardiovascular/Peripheral Vascular    Benign essential hypertension    Well controlled on current regimen today  - Continue losartan 50 mg daily             Endocrine    Hyperlipidemia    LDL 77 (3/2025).   - Continue pravastatin 40 mg daily          Diabetes mellitus, type 2 (H) - Primary    Currently doing very well with Mounjaro 5 mg weekly.  Was struggling with constipation, but this is better with use of magnesium separate.  Has not had weight loss but has had very positive effects with Mounjaro thus far with increased energy, decreased joint pains.  Notes that she has good suppression of food noise in the morning but gets more hungry in the evening time.  -Increase Mounjaro to 7.5 mg weekly, discussed we can increase this by 2.5 mg every 4 weeks  -Has follow-up with MT pharmacist in 4 weeks, pending that discussion will decide on next month's dose  -Repeat A1c today  -Follow-up 3 months         Relevant Medications    Tirzepatide (MOUNJARO) 7.5 MG/0.5ML SOAJ auto-injector pen    Other Relevant Orders    Hemoglobin A1c       Behavioral Health    Schizoaffective disorder (H)    Follows with CHER Alarcon at [facility] in [place]   -Continue perphenazine 8 mg at bedtime, will leave management to her psychiatric provider            Neurology/Sleep    Restless legs syndrome (RLS)    Better with compression stockings. Ferritin 71 (2/2025), okay without iron supplement.            FEN/Gastrointestinal    Drug-induced constipation    2/2 mounjaro. Feels great relief with mag citrate one bottle weekly. Discussed adding fiber, senna +/- miralax. She really feels best with the magnesium citrate.   - Okay to continue magnesium citrate weekly PRN, refills provided today  - Really work to increase fluid intake, goal 40-60 oz daily  - Continue to work on increased walking         Relevant Medications    magnesium citrate 1.745 GM/30ML  solution       Orthopedic/Musculoskeletal    Hip pain, right    Has had onset of right lateral hip pain in the last few weeks.  Is tender to palpation right over the trochanteric bursa.  Discussed this may be consistent with trochanteric bursitis versus some arthritis of the hip versus muscular strain.  -Will get x-ray today to evaluate the joint  -Provided exercises/stretches for trochanteric bursitis in AVS  -Orthopedics referral placed if pain does not improve with these exercises         Relevant Orders    XR Pelvis 1/2 Views    Orthopedic  Referral       Other    Chronic fatigue    C/w CFS.  Energy continues to be improved with Mounjaro.  Discussed with her that even if the weight is stable, this improvement in energy is a great success and we should be proud of this.  -Continue Mounjaro  -Continue working to increase her exercise and walking as she is doing             The longitudinal plan of care for the diagnosis(es)/condition(s) as documented were addressed during this visit. Due to the added complexity in care, I will continue to support Mayra in the subsequent management and with ongoing continuity of care.      I spent a total of 30 minutes on the day of the visit.   Time spent by me today doing chart review, history and exam, documentation and further activities per the note     Follow-up 3 months    Subjective   Mayra is a 65 year old, presenting for the following health issues:  Diabetes (Follow up. )        6/25/2025     3:13 PM   Additional Questions   Roomed by Adolph Resendiz MA   Accompanied by Self     History of Present Illness     Mayra is here for f/up.     T2DM: current regimen mounjaro 5 mg weekly. We left dose the same last time 2/2 constipation.   - Last A1c 5.9 (2/25/25)  -Since starting Mounjaro, weight has not started coming off but overall she does feel better.  Has more energy and some of her joint pain is improved.  She even was able to walk 2 miles yesterday.  Says she does  "not need to spend as much time on the couch.  Wt Readings from Last 4 Encounters:   06/25/25 98 kg (216 lb 1.6 oz)   04/16/25 97.5 kg (214 lb 14.4 oz)   03/12/25 96.2 kg (212 lb)   02/25/25 96.8 kg (213 lb 4.8 oz)     Increased hip and back pain a week or so ago.  The back pain is feeling a little bit better but the hip pain is still there.  Lateral right hip pain.  Does note that her knee pain is better since starting Mounjaro.    HTN: losartan 50 mg daily. BP today 122/60    RLS: compression stockings. Ferritin 71 (2/2025).     MDD: zoloft 200 mg daily    CARLTON: zoloft 200 mg daily, gabapentin 600 mg at bedtime, trazodone 100 mg at bedtime     Constipation: Mag citrate once a week PRN, senna 1-2 tablets daily PRN, working on fluid intake and fiber     Insomnia: Meds above. Did miss CBT-I appt 5/8/25.     She eats 2-3 servings of fruits and vegetables daily.She consumes 0 sweetened beverage(s) daily.She exercises with enough effort to increase her heart rate 20 to 29 minutes per day.  She exercises with enough effort to increase her heart rate 3 or less days per week.   She is taking medications regularly.                Review of Systems  Constitutional, neuro, ENT, endocrine, pulmonary, cardiac, gastrointestinal, genitourinary, musculoskeletal, integument and psychiatric systems are negative, except as otherwise noted.      Objective    /60   Pulse 71   Temp 98.2  F (36.8  C) (Oral)   Resp 12   Ht 1.651 m (5' 5\")   Wt 98 kg (216 lb 1.6 oz)   LMP  (LMP Unknown)   SpO2 97%   BMI 35.96 kg/m    Body mass index is 35.96 kg/m .  Physical Exam   GENERAL: alert and no distress, affect is bright today  EYES: Eyes grossly normal to inspection and conjunctivae and sclerae normal  HENT: nose and mouth without ulcers or lesions  RESP: lungs clear to auscultation - no rales, rhonchi or wheezes  CV: regular rate and rhythm, normal S1 S2, no S3 or S4, no murmur, click or rub, no peripheral edema  ABDOMEN: soft, " nontender  MS: mild TTP R scapular musculature. Also TTP R lateral hip with increased pain in this area with lateral rotation of the hip. No increased pain with forward flexion or internal rotation.   SKIN: no suspicious lesions or rashes  NEURO: No focal deficits, mentation intact and speech normal  PSYCH: mentation appears normal, affect normal/bright    No results found for this or any previous visit (from the past 24 hours).        Signed Electronically by: Louise Rivera MD

## 2025-06-26 ENCOUNTER — PATIENT OUTREACH (OUTPATIENT)
Dept: CARE COORDINATION | Facility: CLINIC | Age: 65
End: 2025-06-26
Payer: COMMERCIAL

## 2025-06-30 ENCOUNTER — PATIENT OUTREACH (OUTPATIENT)
Dept: CARE COORDINATION | Facility: CLINIC | Age: 65
End: 2025-06-30
Payer: COMMERCIAL

## 2025-07-10 ENCOUNTER — ANCILLARY PROCEDURE (OUTPATIENT)
Dept: MAMMOGRAPHY | Facility: HOSPITAL | Age: 65
End: 2025-07-10
Attending: INTERNAL MEDICINE
Payer: COMMERCIAL

## 2025-07-10 DIAGNOSIS — Z12.31 VISIT FOR SCREENING MAMMOGRAM: ICD-10-CM

## 2025-07-10 PROCEDURE — 77063 BREAST TOMOSYNTHESIS BI: CPT

## 2025-07-20 ENCOUNTER — MYC MEDICAL ADVICE (OUTPATIENT)
Dept: INTERNAL MEDICINE | Facility: CLINIC | Age: 65
End: 2025-07-20
Payer: COMMERCIAL

## 2025-07-20 DIAGNOSIS — E11.9 TYPE 2 DIABETES MELLITUS WITHOUT COMPLICATION, WITHOUT LONG-TERM CURRENT USE OF INSULIN (H): Primary | ICD-10-CM

## 2025-07-21 ENCOUNTER — PATIENT OUTREACH (OUTPATIENT)
Dept: CARE COORDINATION | Facility: CLINIC | Age: 65
End: 2025-07-21
Payer: COMMERCIAL

## 2025-07-23 ENCOUNTER — MYC MEDICAL ADVICE (OUTPATIENT)
Dept: INTERNAL MEDICINE | Facility: CLINIC | Age: 65
End: 2025-07-23
Payer: COMMERCIAL

## 2025-07-23 DIAGNOSIS — E11.9 TYPE 2 DIABETES MELLITUS WITHOUT COMPLICATION, WITHOUT LONG-TERM CURRENT USE OF INSULIN (H): Primary | ICD-10-CM

## 2025-08-04 ENCOUNTER — PATIENT OUTREACH (OUTPATIENT)
Dept: CARE COORDINATION | Facility: CLINIC | Age: 65
End: 2025-08-04
Payer: COMMERCIAL

## 2025-08-05 ENCOUNTER — MYC MEDICAL ADVICE (OUTPATIENT)
Dept: INTERNAL MEDICINE | Facility: CLINIC | Age: 65
End: 2025-08-05
Payer: COMMERCIAL

## 2025-08-21 ENCOUNTER — VIRTUAL VISIT (OUTPATIENT)
Dept: PHARMACY | Facility: PHYSICIAN GROUP | Age: 65
End: 2025-08-21
Payer: COMMERCIAL

## 2025-08-21 DIAGNOSIS — E11.9 TYPE 2 DIABETES MELLITUS WITHOUT COMPLICATION, WITHOUT LONG-TERM CURRENT USE OF INSULIN (H): Primary | ICD-10-CM

## 2025-08-21 DIAGNOSIS — I10 BENIGN ESSENTIAL HYPERTENSION: ICD-10-CM

## 2025-08-21 DIAGNOSIS — K58.2 IRRITABLE BOWEL SYNDROME WITH BOTH CONSTIPATION AND DIARRHEA: ICD-10-CM

## 2025-08-25 ENCOUNTER — PATIENT OUTREACH (OUTPATIENT)
Dept: CARE COORDINATION | Facility: CLINIC | Age: 65
End: 2025-08-25
Payer: COMMERCIAL

## 2025-08-27 ENCOUNTER — PATIENT OUTREACH (OUTPATIENT)
Dept: CARE COORDINATION | Facility: CLINIC | Age: 65
End: 2025-08-27
Payer: COMMERCIAL

## 2027-03-07 ENCOUNTER — RECORDS - HEALTHEAST (OUTPATIENT)
Dept: ADMINISTRATIVE | Facility: OTHER | Age: 67
End: 2027-03-07